# Patient Record
Sex: FEMALE | Race: WHITE | NOT HISPANIC OR LATINO
[De-identification: names, ages, dates, MRNs, and addresses within clinical notes are randomized per-mention and may not be internally consistent; named-entity substitution may affect disease eponyms.]

---

## 2018-08-17 ENCOUNTER — RESULT REVIEW (OUTPATIENT)
Age: 53
End: 2018-08-17

## 2018-10-26 ENCOUNTER — RESULT REVIEW (OUTPATIENT)
Age: 53
End: 2018-10-26

## 2019-04-29 ENCOUNTER — TRANSCRIPTION ENCOUNTER (OUTPATIENT)
Age: 54
End: 2019-04-29

## 2019-07-01 ENCOUNTER — OUTPATIENT (OUTPATIENT)
Dept: OUTPATIENT SERVICES | Facility: HOSPITAL | Age: 54
LOS: 1 days | End: 2019-07-01
Payer: MEDICAID

## 2019-07-01 PROCEDURE — G9001: CPT

## 2019-07-03 DIAGNOSIS — Z71.89 OTHER SPECIFIED COUNSELING: ICD-10-CM

## 2019-09-10 ENCOUNTER — RESULT REVIEW (OUTPATIENT)
Age: 54
End: 2019-09-10

## 2019-11-01 ENCOUNTER — RESULT REVIEW (OUTPATIENT)
Age: 54
End: 2019-11-01

## 2020-12-14 ENCOUNTER — RESULT REVIEW (OUTPATIENT)
Age: 55
End: 2020-12-14

## 2021-07-14 ENCOUNTER — EMERGENCY (EMERGENCY)
Facility: HOSPITAL | Age: 56
LOS: 0 days | Discharge: HOME | End: 2021-07-14
Attending: EMERGENCY MEDICINE | Admitting: EMERGENCY MEDICINE
Payer: MEDICAID

## 2021-07-14 VITALS
DIASTOLIC BLOOD PRESSURE: 98 MMHG | OXYGEN SATURATION: 98 % | HEIGHT: 62 IN | TEMPERATURE: 98 F | RESPIRATION RATE: 18 BRPM | HEART RATE: 81 BPM | WEIGHT: 199.96 LBS | SYSTOLIC BLOOD PRESSURE: 148 MMHG

## 2021-07-14 DIAGNOSIS — H66.92 OTITIS MEDIA, UNSPECIFIED, LEFT EAR: ICD-10-CM

## 2021-07-14 DIAGNOSIS — G51.0 BELL'S PALSY: ICD-10-CM

## 2021-07-14 DIAGNOSIS — K08.89 OTHER SPECIFIED DISORDERS OF TEETH AND SUPPORTING STRUCTURES: ICD-10-CM

## 2021-07-14 PROCEDURE — 99283 EMERGENCY DEPT VISIT LOW MDM: CPT

## 2021-07-14 NOTE — ED PROVIDER NOTE - OBJECTIVE STATEMENT
56 yo female presents c/o L facial paralysis that started at 1am. pt mentions to have recently experienced dental pain and ear pain. pt states she is following with her dentist this week and was seen by UC and started on amoxicillin for her ear infection. denies any other symptoms including fevers, chill, headache, recent illness/travel, cough, abdominal pain, chest pain, or SOB.

## 2021-07-14 NOTE — ED PROVIDER NOTE - CLINICAL SUMMARY MEDICAL DECISION MAKING FREE TEXT BOX
pt with bells palsy, likely from dental/ear pain.  pt otherwise nontoxic.  pt will be started on steroids.  pt to f/u with neuro outpt

## 2021-07-14 NOTE — ED PROVIDER NOTE - PHYSICAL EXAMINATION
Gen: NAD, AOx3  Head: NCAT  HEENT: PERRL, oral mucosa moist, normal conjunctiva, oropharynx clear without exudate or erythema, TM clear, no dental drainage/edema/abscess   Lung: CTAB, no respiratory distress, no wheezing, rales, rhonchi  CV: normal s1/s2, rrr, Normal perfusion, pulses 2+ throughout  Abd: soft, NTND, no CVA tenderness  Genitourinary: no pelvic tenderness  MSK: No edema, no visible deformities, full range of motion in all 4 extremities  Neuro: L facial droop w/ forehead involvement, CN II-XII grossly intact, no nystagmus/pronator drift, coordination/sensation intact, strength 5/5 BUE/BLE, steady gait   Skin: No rash   Psych: normal affect

## 2021-07-14 NOTE — ED PROVIDER NOTE - NS ED ROS FT
Constitutional: (-) fever  Eyes/ENT: (-) visual changes   Cardiovascular: (-) chest pain, (-) syncope  Respiratory: (-) cough, (-) shortness of breath  Gastrointestinal: (-) vomiting, (-) diarrhea  Genitourinary: (-) dysuria, (-) hesitancy, (-) frequency   Musculoskeletal: (-) neck pain, (-) back pain, (-) joint pain  Integumentary: (-) rash, (-) edema  Neurological: (-) headache, (-) altered mental status, facial paralysis   Allergic/Immunologic: (-) pruritus

## 2021-07-14 NOTE — ED PROVIDER NOTE - PATIENT PORTAL LINK FT
You can access the FollowMyHealth Patient Portal offered by [facility] by registering at the following website: [email or web address]. By joining [employer]’s FollowMyHealth portal, you will also be able to view your health information using other applications (apps) compatible with our system.

## 2021-07-14 NOTE — ED ADULT NURSE NOTE - ISOLATION TYPE:
None Non-Graft Cartilage Fenestration Text: The cartilage was fenestrated with a 2mm punch biopsy to help facilitate healing.

## 2021-07-14 NOTE — ED PROVIDER NOTE - NSFOLLOWUPINSTRUCTIONS_ED_ALL_ED_FT
Please follow up with your primary care physician within 24-72 hours and return immediately if symptoms worsen.    [place] Palsy    WHAT YOU NEED TO KNOW:    Bell palsy is a sudden weakness or paralysis of one side of your face. Bell palsy occurs when the nerve that controls the muscles in your face becomes swollen or irritated.     DISCHARGE INSTRUCTIONS:    Medicines:     Medicine may be given to decrease swelling and irritation of your facial nerve. You may receive antiviral medicine if your healthcare provider thinks a virus caused your Bell palsy. Your healthcare provider may also suggest acetaminophen or ibuprofen to reduce pain. These medicines are available without a doctor's order. Ask which medicine to take, and how much you need. Follow directions. Acetaminophen can cause liver damage, and ibuprofen can cause stomach bleeding or kidney damage.       Take your medicine as directed. Contact your healthcare provider if you think your medicine is not helping or if you have side effects. Tell him if you are allergic to any medicine. Keep a list of the medicines, vitamins, and herbs you take. Include the amounts, and when and why you take them. Bring the list or the pill bottles to follow-up visits. Carry your medicine list with you in case of an emergency.    Follow up with your healthcare provider as directed: Write down your questions so you remember to ask them during your visits.    Eye care: Your healthcare provider may recommend that you use artificial tears during the day to keep your eye moist. You may need to use an eye ointment at night. You may also need to wear a patch over your eye and tape it shut while you sleep. This helps keep your eye from getting dry and infected. Wear sunglasses to protect your eye from direct sunlight. Stay away from places that have fumes, dust, or other particles in the air that may harm your eye.    Physical therapy: A physical therapist may teach you how to massage your face and exercise the nerves and muscles in your face. This may help you get better sooner and prevent long-term problems. You can exercise on your own when your facial movement begins to return. Open and close your eye, wink, and smile wide. Do the exercises for 15 or 20 minutes several times a day.    Contact your healthcare provider if:     You have a fever.      Your eye becomes red, irritated, or painful.      You have questions or concerns about your condition or care.    Return to the emergency department if:     You develop weakness or numbness on one side of your body (other than your face).      You have double vision, or you lose vision in your eye.      You have trouble thinking clearly.

## 2021-07-14 NOTE — ED PROVIDER NOTE - ATTENDING CONTRIBUTION TO CARE
56 yo f with no pmh, presents with L sided facial droop started 1 am.  pt says has been dealing with L upper dental pain and ear pain.  had been seen at an  and given amox for ear infection.  no headache, no n/v/d, no fever, chills, no diff swallowing.  no recent travel.  exam: nad, ncat, perrl, eomi, mmm, rrr, ctab, abd soft, nt,nd, aox3, cn7 palsy, forehead nonsparing, 5/5 strength all ext, sensation intact, finger to nose normal, romberg neg, prontator drift neg, gait normal imp: pt with bells palsy, likely from dental/ear pain.  pt otherwise nontoxic.  pt will be started on steroids.  pt to f/u with neuro outpt

## 2021-07-14 NOTE — ED ADULT TRIAGE NOTE - CHIEF COMPLAINT QUOTE
Pt c/o L facial paralysis that began at 0100 yesterday after dental pain began, pt cleared by jian GARRISON. No other neuro deficits.

## 2021-07-22 ENCOUNTER — EMERGENCY (EMERGENCY)
Facility: HOSPITAL | Age: 56
LOS: 0 days | Discharge: HOME | End: 2021-07-22
Attending: EMERGENCY MEDICINE | Admitting: EMERGENCY MEDICINE
Payer: MEDICAID

## 2021-07-22 VITALS
TEMPERATURE: 97 F | DIASTOLIC BLOOD PRESSURE: 73 MMHG | HEART RATE: 73 BPM | RESPIRATION RATE: 18 BRPM | SYSTOLIC BLOOD PRESSURE: 147 MMHG | OXYGEN SATURATION: 99 %

## 2021-07-22 VITALS
SYSTOLIC BLOOD PRESSURE: 178 MMHG | OXYGEN SATURATION: 97 % | DIASTOLIC BLOOD PRESSURE: 72 MMHG | RESPIRATION RATE: 20 BRPM | TEMPERATURE: 98 F | HEIGHT: 62 IN | WEIGHT: 197.09 LBS | HEART RATE: 85 BPM

## 2021-07-22 DIAGNOSIS — Z88.6 ALLERGY STATUS TO ANALGESIC AGENT: ICD-10-CM

## 2021-07-22 DIAGNOSIS — I10 ESSENTIAL (PRIMARY) HYPERTENSION: ICD-10-CM

## 2021-07-22 DIAGNOSIS — Z79.899 OTHER LONG TERM (CURRENT) DRUG THERAPY: ICD-10-CM

## 2021-07-22 DIAGNOSIS — G51.0 BELL'S PALSY: ICD-10-CM

## 2021-07-22 DIAGNOSIS — H92.02 OTALGIA, LEFT EAR: ICD-10-CM

## 2021-07-22 DIAGNOSIS — E78.5 HYPERLIPIDEMIA, UNSPECIFIED: ICD-10-CM

## 2021-07-22 DIAGNOSIS — R11.0 NAUSEA: ICD-10-CM

## 2021-07-22 DIAGNOSIS — Z88.1 ALLERGY STATUS TO OTHER ANTIBIOTIC AGENTS STATUS: ICD-10-CM

## 2021-07-22 DIAGNOSIS — Z88.2 ALLERGY STATUS TO SULFONAMIDES: ICD-10-CM

## 2021-07-22 DIAGNOSIS — E03.9 HYPOTHYROIDISM, UNSPECIFIED: ICD-10-CM

## 2021-07-22 LAB
ANION GAP SERPL CALC-SCNC: 8 MMOL/L — SIGNIFICANT CHANGE UP (ref 7–14)
BASOPHILS # BLD AUTO: 0.04 K/UL — SIGNIFICANT CHANGE UP (ref 0–0.2)
BASOPHILS NFR BLD AUTO: 0.4 % — SIGNIFICANT CHANGE UP (ref 0–1)
BUN SERPL-MCNC: 26 MG/DL — HIGH (ref 10–20)
CALCIUM SERPL-MCNC: 9.7 MG/DL — SIGNIFICANT CHANGE UP (ref 8.5–10.1)
CHLORIDE SERPL-SCNC: 99 MMOL/L — SIGNIFICANT CHANGE UP (ref 98–110)
CO2 SERPL-SCNC: 31 MMOL/L — SIGNIFICANT CHANGE UP (ref 17–32)
CREAT SERPL-MCNC: 0.9 MG/DL — SIGNIFICANT CHANGE UP (ref 0.7–1.5)
EOSINOPHIL # BLD AUTO: 0.1 K/UL — SIGNIFICANT CHANGE UP (ref 0–0.7)
EOSINOPHIL NFR BLD AUTO: 0.9 % — SIGNIFICANT CHANGE UP (ref 0–8)
GLUCOSE SERPL-MCNC: 93 MG/DL — SIGNIFICANT CHANGE UP (ref 70–99)
HCT VFR BLD CALC: 41 % — SIGNIFICANT CHANGE UP (ref 37–47)
HGB BLD-MCNC: 13.6 G/DL — SIGNIFICANT CHANGE UP (ref 12–16)
IMM GRANULOCYTES NFR BLD AUTO: 1.1 % — HIGH (ref 0.1–0.3)
LYMPHOCYTES # BLD AUTO: 27.5 % — SIGNIFICANT CHANGE UP (ref 20.5–51.1)
LYMPHOCYTES # BLD AUTO: 3.07 K/UL — SIGNIFICANT CHANGE UP (ref 1.2–3.4)
MCHC RBC-ENTMCNC: 28 PG — SIGNIFICANT CHANGE UP (ref 27–31)
MCHC RBC-ENTMCNC: 33.2 G/DL — SIGNIFICANT CHANGE UP (ref 32–37)
MCV RBC AUTO: 84.5 FL — SIGNIFICANT CHANGE UP (ref 81–99)
MONOCYTES # BLD AUTO: 0.7 K/UL — HIGH (ref 0.1–0.6)
MONOCYTES NFR BLD AUTO: 6.3 % — SIGNIFICANT CHANGE UP (ref 1.7–9.3)
NEUTROPHILS # BLD AUTO: 7.15 K/UL — HIGH (ref 1.4–6.5)
NEUTROPHILS NFR BLD AUTO: 63.8 % — SIGNIFICANT CHANGE UP (ref 42.2–75.2)
NRBC # BLD: 0 /100 WBCS — SIGNIFICANT CHANGE UP (ref 0–0)
PLATELET # BLD AUTO: 349 K/UL — SIGNIFICANT CHANGE UP (ref 130–400)
POTASSIUM SERPL-MCNC: 4.2 MMOL/L — SIGNIFICANT CHANGE UP (ref 3.5–5)
POTASSIUM SERPL-SCNC: 4.2 MMOL/L — SIGNIFICANT CHANGE UP (ref 3.5–5)
RBC # BLD: 4.85 M/UL — SIGNIFICANT CHANGE UP (ref 4.2–5.4)
RBC # FLD: 13.4 % — SIGNIFICANT CHANGE UP (ref 11.5–14.5)
SODIUM SERPL-SCNC: 138 MMOL/L — SIGNIFICANT CHANGE UP (ref 135–146)
WBC # BLD: 11.18 K/UL — HIGH (ref 4.8–10.8)
WBC # FLD AUTO: 11.18 K/UL — HIGH (ref 4.8–10.8)

## 2021-07-22 PROCEDURE — 99285 EMERGENCY DEPT VISIT HI MDM: CPT

## 2021-07-22 PROCEDURE — G1004: CPT

## 2021-07-22 PROCEDURE — 70481 CT ORBIT/EAR/FOSSA W/DYE: CPT | Mod: 26,MG

## 2021-07-22 RX ORDER — NEOMYCIN/POLYMYXIN B/HYDROCORT
3 SUSPENSION, DROPS(FINAL DOSAGE FORM)(ML) OTIC (EAR)
Qty: 10 | Refills: 0
Start: 2021-07-22 | End: 2021-07-28

## 2021-07-22 RX ORDER — SODIUM CHLORIDE 9 MG/ML
1000 INJECTION, SOLUTION INTRAVENOUS ONCE
Refills: 0 | Status: COMPLETED | OUTPATIENT
Start: 2021-07-22 | End: 2021-07-22

## 2021-07-22 RX ORDER — MECLIZINE HCL 12.5 MG
50 TABLET ORAL ONCE
Refills: 0 | Status: COMPLETED | OUTPATIENT
Start: 2021-07-22 | End: 2021-07-22

## 2021-07-22 RX ORDER — KETOROLAC TROMETHAMINE 30 MG/ML
30 SYRINGE (ML) INJECTION ONCE
Refills: 0 | Status: DISCONTINUED | OUTPATIENT
Start: 2021-07-22 | End: 2021-07-22

## 2021-07-22 RX ORDER — ONDANSETRON 8 MG/1
4 TABLET, FILM COATED ORAL ONCE
Refills: 0 | Status: COMPLETED | OUTPATIENT
Start: 2021-07-22 | End: 2021-07-22

## 2021-07-22 RX ADMIN — Medication 50 MILLIGRAM(S): at 12:13

## 2021-07-22 RX ADMIN — SODIUM CHLORIDE 1000 MILLILITER(S): 9 INJECTION, SOLUTION INTRAVENOUS at 12:13

## 2021-07-22 RX ADMIN — ONDANSETRON 4 MILLIGRAM(S): 8 TABLET, FILM COATED ORAL at 12:13

## 2021-07-22 RX ADMIN — Medication 30 MILLIGRAM(S): at 13:52

## 2021-07-22 NOTE — ED PROVIDER NOTE - ATTENDING CONTRIBUTION TO CARE
56 yo female  PMH HTN, hypothyroidism, recently diagnosed with Bell's palsy sent by her PMD for CT scan to r/o mastoiditis.  Patient finished a course of Amoxicillin for ear pain and redness,  symptoms have not changed much and now she is taking antiviral medications.  She c/o persistent pain around her ear, was unable to ENT in a timely fashion and her doctor sent her to ED.  Denies HA, neck pain, fever, chills, change in  hearing, no other additional complaints.  Well-appearing female on NAD, + left sided Bell's palsy,. supple neck, mild erythema of the left pinna, nml TM b/l,  no mastoid ttp, or erythema, supple neck, no cervical JUDITH, nml work of breathing.  CT is negative for  acute mastoiditis, WBC WNL, patient was advised to follow up with ENT and her PMD, strict return precautions given.  She verbalized understanding and is amenable with the plan.

## 2021-07-22 NOTE — ED ADULT NURSE NOTE - NSIMPLEMENTINTERV_GEN_ALL_ED
Implemented All Universal Safety Interventions:  [place] to call system. Call bell, personal items and telephone within reach. Instruct patient to call for assistance. Room bathroom lighting operational. Non-slip footwear when patient is off stretcher. Physically safe environment: no spills, clutter or unnecessary equipment. Stretcher in lowest position, wheels locked, appropriate side rails in place.

## 2021-07-22 NOTE — ED PROVIDER NOTE - OBJECTIVE STATEMENT
Pt is a 56y/o female with a pmhx if HLD, recently dx with bells palsy and started on Valacyclovir/prednisone/amoxicillin presents today for eval of worsened left ear pain. Pt was sent in by her PMD for eval of mastoiditis. Pt denies fever, chills, weakness, numbness, CP, SOB.

## 2021-07-22 NOTE — ED PROVIDER NOTE - PHYSICAL EXAMINATION
VITAL SIGNS: I have reviewed nursing notes and confirm.  CONSTITUTIONAL: Well-developed; well-nourished; in no acute distress.   SKIN: skin exam is warm and dry, no acute rash.    HEAD: Normocephalic; atraumatic.  EYES: conjunctiva and sclera clear.  ENT: [place] left ear appears erythematous/edema no pain with manipulation of pinna, external ear canal visualized, Tympanic membrane intact No nasal discharge; airway clear.  NECK: Supple; non tender.  CARD: S1, S2 normal; no murmurs, gallops, or rubs. Regular rate and rhythm.   RESP: No wheezes, rales or rhonchi.  ABD: Normal bowel sounds; soft; non-distended; non-tender  EXT: Normal ROM.  No clubbing, cyanosis or edema.   NEURO: Alert, oriented, grossly unremarkable

## 2021-07-22 NOTE — ED PROVIDER NOTE - PATIENT PORTAL LINK FT
You can access the FollowMyHealth Patient Portal offered by [facility] by registering at the following website: [email or web address]. By joining [employer]’s FollowMyHealth portal, you will also be able to view your health information using other applications (apps) compatible with our system.

## 2021-07-22 NOTE — ED PROVIDER NOTE - CARE PROVIDER_API CALL
Aparna Schmid)  Otolaryngology  [address] [place] [address], [address] [place] [address]  Phone: [phone]  Fax: [phone]  Follow Up Time:

## 2021-07-22 NOTE — ED PROVIDER NOTE - NS ED ROS FT
Eyes:  No visual changes, eye pain or discharge.  ENMT:  + ear pain No hearing changes, pain, discharge or infections. No neck pain or stiffness.  Cardiac:  No chest pain, SOB or edema. No chest pain with exertion.  Respiratory:  No cough or respiratory distress. No hemoptysis. No history of asthma or RAD.  GI:  + nausea No vomiting, diarrhea or abdominal pain.  MS:  No myalgia, muscle weakness, joint pain or back pain.  Neuro:  No headache or weakness.  No LOC.  Skin:  No skin rash.   Endocrine: No history of thyroid disease or diabetes.  Except as documented in the HPI,  all other systems are negative.

## 2021-07-22 NOTE — ED PROVIDER NOTE - CLINICAL SUMMARY MEDICAL DECISION MAKING FREE TEXT BOX
54 yo female  PMH HTN, hypothyroidism, recently diagnosed with Bell's palsy sent by her PMD for CT scan to r/o mastoiditis.  Patient finished a course of Amoxicillin for ear pain and redness,  symptoms have not changed much and now she is taking antiviral medications.  She c/o persistent pain around her ear, was unable to ENT in a timely fashion and her doctor sent her to ED.  Denies HA, neck pain, fever, chills, change in  hearing, no other additional complaints.  Well-appearing female on NAD, + left sided Bell's palsy,. supple neck, mild erythema of the left pinna, nml TM b/l,  no mastoid ttp, or erythema, supple neck, no cervical JUDITH, nml work of breathing.  CT is negative for  acute mastoiditis, WBC WNL, patient was advised to follow up with ENT and her PMD, strict return precautions given.  She verbalized understanding and is amenable with the plan.

## 2021-07-24 LAB
B BURGDOR C6 AB SER-ACNC: NEGATIVE — SIGNIFICANT CHANGE UP
B BURGDOR IGG+IGM SER-ACNC: 0.3 INDEX — SIGNIFICANT CHANGE UP (ref 0.01–0.89)

## 2021-07-26 ENCOUNTER — APPOINTMENT (OUTPATIENT)
Dept: OTOLARYNGOLOGY | Facility: CLINIC | Age: 56
End: 2021-07-26
Payer: MEDICAID

## 2021-07-26 VITALS — BODY MASS INDEX: 36.25 KG/M2 | WEIGHT: 197 LBS | HEIGHT: 62 IN

## 2021-07-26 DIAGNOSIS — F41.9 ANXIETY DISORDER, UNSPECIFIED: ICD-10-CM

## 2021-07-26 DIAGNOSIS — F32.9 MAJOR DEPRESSIVE DISORDER, SINGLE EPISODE, UNSPECIFIED: ICD-10-CM

## 2021-07-26 DIAGNOSIS — Z78.9 OTHER SPECIFIED HEALTH STATUS: ICD-10-CM

## 2021-07-26 DIAGNOSIS — Z83.3 FAMILY HISTORY OF DIABETES MELLITUS: ICD-10-CM

## 2021-07-26 PROBLEM — Z00.00 ENCOUNTER FOR PREVENTIVE HEALTH EXAMINATION: Status: ACTIVE | Noted: 2021-07-26

## 2021-07-26 PROCEDURE — 92557 COMPREHENSIVE HEARING TEST: CPT

## 2021-07-26 PROCEDURE — 92570 ACOUSTIC IMMITANCE TESTING: CPT

## 2021-07-26 PROCEDURE — 99204 OFFICE O/P NEW MOD 45 MIN: CPT | Mod: 25

## 2021-07-26 RX ORDER — BUPROPION HYDROCHLORIDE 200 MG/1
TABLET, FILM COATED, EXTENDED RELEASE ORAL
Refills: 0 | Status: ACTIVE | COMMUNITY

## 2021-07-26 RX ORDER — VALACYCLOVIR 500 MG/1
TABLET, FILM COATED ORAL
Refills: 0 | Status: ACTIVE | COMMUNITY

## 2021-07-26 RX ORDER — MECLIZINE HYDROCHLORIDE 25 MG/1
TABLET ORAL
Refills: 0 | Status: ACTIVE | COMMUNITY

## 2021-07-26 RX ORDER — DULOXETINE HYDROCHLORIDE 40 MG/1
CAPSULE, DELAYED RELEASE PELLETS ORAL
Refills: 0 | Status: ACTIVE | COMMUNITY

## 2021-07-26 RX ORDER — PREDNISONE 20 MG/1
20 TABLET ORAL
Qty: 63 | Refills: 0 | Status: ACTIVE | COMMUNITY
Start: 2021-07-26 | End: 1900-01-01

## 2021-07-28 NOTE — CONSULT LETTER
[Dear  ___] : Dear  [unfilled], [Consult Letter:] : I had the pleasure of evaluating your patient, [unfilled]. [Please see my note below.] : Please see my note below. [Consult Closing:] : Thank you very much for allowing me to participate in the care of this patient.  If you have any questions, please do not hesitate to contact me. [Sincerely,] : Sincerely, [FreeTextEntry2] : Myranda Morales MD [FreeTextEntry3] : Aparna Schmid MD\par Otolaryngology - Head & Neck Surgery\par

## 2021-07-28 NOTE — REASON FOR VISIT
[Initial Evaluation] : an initial evaluation for [FreeTextEntry2] : left otalgia and facial weakness

## 2021-07-28 NOTE — PHYSICAL EXAM
[Midline] : trachea located in midline position [Normal] : no rashes [de-identified] : several small maculopapular lesions noted on left conchal bowl, with crusting. Erythema of left EAC, minimal EAC edema  [de-identified] : HB VI/VI

## 2021-07-28 NOTE — DATA REVIEWED
[de-identified] : relevant images and reports personally reviewed by me:\par 7/26/21: type A tymps AU, hearing WNL right and mild to mod SNHL left, Avalos lateralizes to right, consistent with left SNHL [de-identified] : relevant images and reports personally reviewed by me:\par \par Ct temporal bones performed 7/22/21: unremarkable CT scan.

## 2021-07-28 NOTE — ASSESSMENT
[FreeTextEntry1] : - restart high dose steroids, with taper, 2 weeks at 60mg, then 1 week at 40mg, then 1 week at 20mg\par - continue antiviral\par - will obtain MRI IAC\par - discussed eye care, eye drops during the day, lacrilube and tape eye closed/patch at night\par - f/up in 2 weeks

## 2021-07-28 NOTE — HISTORY OF PRESENT ILLNESS
[de-identified] : Patient presents today with c/o left otalgia, nausea, bells palsy.. Patient states about 3 weeks ago started having ear pain. Seen in the UC; and sent to the ER due to left sided facial droop. Patient admits having blisters in the left ear. Patient seen in the ER to rule out stroke. Put on Valacyclovir by PCP and prednisone 60mg for 1 week, by ED, completed last week. Patient admits being bit by a tick sometime in June 2021. Patient also admits having water balloon pop in her left ear the end of June. Also needed to have tooth extracted- done Friday 7/23/21. Currently patient continues to have left facial droop. Jaw pain. Left otalgia. Muffled hearing loss. Sharp pains in the ear at times. Eye twitching at times. Unstable gait.

## 2021-08-14 ENCOUNTER — OUTPATIENT (OUTPATIENT)
Dept: OUTPATIENT SERVICES | Facility: HOSPITAL | Age: 56
LOS: 1 days | Discharge: HOME | End: 2021-08-14
Payer: MEDICAID

## 2021-08-14 ENCOUNTER — RESULT REVIEW (OUTPATIENT)
Age: 56
End: 2021-08-14

## 2021-08-14 DIAGNOSIS — G51.0 BELL'S PALSY: ICD-10-CM

## 2021-08-14 PROCEDURE — 70553 MRI BRAIN STEM W/O & W/DYE: CPT | Mod: 26

## 2021-08-19 ENCOUNTER — APPOINTMENT (OUTPATIENT)
Dept: OTOLARYNGOLOGY | Facility: CLINIC | Age: 56
End: 2021-08-19

## 2021-08-22 ENCOUNTER — INPATIENT (INPATIENT)
Facility: HOSPITAL | Age: 56
LOS: 13 days | Discharge: HOME | End: 2021-09-05
Attending: INTERNAL MEDICINE | Admitting: INTERNAL MEDICINE
Payer: MEDICAID

## 2021-08-22 VITALS
TEMPERATURE: 98 F | SYSTOLIC BLOOD PRESSURE: 104 MMHG | OXYGEN SATURATION: 92 % | HEIGHT: 62 IN | DIASTOLIC BLOOD PRESSURE: 75 MMHG | RESPIRATION RATE: 25 BRPM | HEART RATE: 88 BPM

## 2021-08-22 LAB
ALBUMIN SERPL ELPH-MCNC: 3.4 G/DL — LOW (ref 3.5–5.2)
ALP SERPL-CCNC: 58 U/L — SIGNIFICANT CHANGE UP (ref 30–115)
ALT FLD-CCNC: 57 U/L — HIGH (ref 0–41)
ANION GAP SERPL CALC-SCNC: 23 MMOL/L — HIGH (ref 7–14)
ANISOCYTOSIS BLD QL: SLIGHT — SIGNIFICANT CHANGE UP
AST SERPL-CCNC: 72 U/L — HIGH (ref 0–41)
BASE EXCESS BLDV CALC-SCNC: 3.9 MMOL/L — HIGH (ref -2–2)
BASOPHILS # BLD AUTO: 0 K/UL — SIGNIFICANT CHANGE UP (ref 0–0.2)
BASOPHILS NFR BLD AUTO: 0 % — SIGNIFICANT CHANGE UP (ref 0–1)
BILIRUB SERPL-MCNC: 0.4 MG/DL — SIGNIFICANT CHANGE UP (ref 0.2–1.2)
BUN SERPL-MCNC: 19 MG/DL — SIGNIFICANT CHANGE UP (ref 10–20)
CA-I SERPL-SCNC: 1.19 MMOL/L — SIGNIFICANT CHANGE UP (ref 1.12–1.3)
CALCIUM SERPL-MCNC: 9.4 MG/DL — SIGNIFICANT CHANGE UP (ref 8.5–10.1)
CHLORIDE SERPL-SCNC: 89 MMOL/L — LOW (ref 98–110)
CO2 SERPL-SCNC: 18 MMOL/L — SIGNIFICANT CHANGE UP (ref 17–32)
CREAT SERPL-MCNC: 0.9 MG/DL — SIGNIFICANT CHANGE UP (ref 0.7–1.5)
D DIMER BLD IA.RAPID-MCNC: 348 NG/ML DDU — HIGH (ref 0–230)
EOSINOPHIL # BLD AUTO: 0 K/UL — SIGNIFICANT CHANGE UP (ref 0–0.7)
EOSINOPHIL NFR BLD AUTO: 0 % — SIGNIFICANT CHANGE UP (ref 0–8)
GAS PNL BLDV: 131 MMOL/L — LOW (ref 136–145)
GAS PNL BLDV: SIGNIFICANT CHANGE UP
GLUCOSE SERPL-MCNC: 134 MG/DL — HIGH (ref 70–99)
HCO3 BLDV-SCNC: 29 MMOL/L — SIGNIFICANT CHANGE UP (ref 22–29)
HCT VFR BLD CALC: 44.6 % — SIGNIFICANT CHANGE UP (ref 37–47)
HCT VFR BLDA CALC: 46.7 % — HIGH (ref 34–44)
HGB BLD CALC-MCNC: 15.2 G/DL — SIGNIFICANT CHANGE UP (ref 14–18)
HGB BLD-MCNC: 14.7 G/DL — SIGNIFICANT CHANGE UP (ref 12–16)
LACTATE BLDV-MCNC: 2.1 MMOL/L — HIGH (ref 0.5–1.6)
LYMPHOCYTES # BLD AUTO: 0 % — LOW (ref 20.5–51.1)
LYMPHOCYTES # BLD AUTO: 0 K/UL — LOW (ref 1.2–3.4)
MANUAL SMEAR VERIFICATION: SIGNIFICANT CHANGE UP
MCHC RBC-ENTMCNC: 27.4 PG — SIGNIFICANT CHANGE UP (ref 27–31)
MCHC RBC-ENTMCNC: 33 G/DL — SIGNIFICANT CHANGE UP (ref 32–37)
MCV RBC AUTO: 83.2 FL — SIGNIFICANT CHANGE UP (ref 81–99)
METAMYELOCYTES # FLD: 0.9 % — HIGH (ref 0–0)
MONOCYTES # BLD AUTO: 0.54 K/UL — SIGNIFICANT CHANGE UP (ref 0.1–0.6)
MONOCYTES NFR BLD AUTO: 5.3 % — SIGNIFICANT CHANGE UP (ref 1.7–9.3)
NEUTROPHILS # BLD AUTO: 9.56 K/UL — HIGH (ref 1.4–6.5)
NEUTROPHILS NFR BLD AUTO: 93.8 % — HIGH (ref 42.2–75.2)
NT-PROBNP SERPL-SCNC: 764 PG/ML — HIGH (ref 0–300)
PCO2 BLDV: 43 MMHG — SIGNIFICANT CHANGE UP (ref 41–51)
PH BLDV: 7.43 — SIGNIFICANT CHANGE UP (ref 7.26–7.43)
PLAT MORPH BLD: NORMAL — SIGNIFICANT CHANGE UP
PLATELET # BLD AUTO: 432 K/UL — HIGH (ref 130–400)
PO2 BLDV: 38 MMHG — SIGNIFICANT CHANGE UP (ref 20–40)
POIKILOCYTOSIS BLD QL AUTO: SIGNIFICANT CHANGE UP
POLYCHROMASIA BLD QL SMEAR: SIGNIFICANT CHANGE UP
POTASSIUM BLDV-SCNC: 4.6 MMOL/L — SIGNIFICANT CHANGE UP (ref 3.3–5.6)
POTASSIUM SERPL-MCNC: 5.4 MMOL/L — HIGH (ref 3.5–5)
POTASSIUM SERPL-SCNC: 5.4 MMOL/L — HIGH (ref 3.5–5)
PROT SERPL-MCNC: 6.5 G/DL — SIGNIFICANT CHANGE UP (ref 6–8)
RBC # BLD: 5.36 M/UL — SIGNIFICANT CHANGE UP (ref 4.2–5.4)
RBC # FLD: 14.3 % — SIGNIFICANT CHANGE UP (ref 11.5–14.5)
RBC BLD AUTO: ABNORMAL
SAO2 % BLDV: 71 % — SIGNIFICANT CHANGE UP
SARS-COV-2 RNA SPEC QL NAA+PROBE: DETECTED
SODIUM SERPL-SCNC: 130 MMOL/L — LOW (ref 135–146)
TROPONIN T SERPL-MCNC: <0.01 NG/ML — SIGNIFICANT CHANGE UP
WBC # BLD: 10.19 K/UL — SIGNIFICANT CHANGE UP (ref 4.8–10.8)
WBC # FLD AUTO: 10.19 K/UL — SIGNIFICANT CHANGE UP (ref 4.8–10.8)

## 2021-08-22 PROCEDURE — 99285 EMERGENCY DEPT VISIT HI MDM: CPT

## 2021-08-22 PROCEDURE — 93010 ELECTROCARDIOGRAM REPORT: CPT

## 2021-08-22 PROCEDURE — 71045 X-RAY EXAM CHEST 1 VIEW: CPT | Mod: 26

## 2021-08-22 RX ORDER — ENOXAPARIN SODIUM 100 MG/ML
40 INJECTION SUBCUTANEOUS
Refills: 0 | Status: DISCONTINUED | OUTPATIENT
Start: 2021-08-22 | End: 2021-09-05

## 2021-08-22 RX ORDER — ENOXAPARIN SODIUM 100 MG/ML
40 INJECTION SUBCUTANEOUS DAILY
Refills: 0 | Status: DISCONTINUED | OUTPATIENT
Start: 2021-08-22 | End: 2021-08-22

## 2021-08-22 RX ORDER — DEXAMETHASONE 0.5 MG/5ML
6 ELIXIR ORAL DAILY
Refills: 0 | Status: DISCONTINUED | OUTPATIENT
Start: 2021-08-22 | End: 2021-08-23

## 2021-08-22 RX ORDER — CHLORHEXIDINE GLUCONATE 213 G/1000ML
1 SOLUTION TOPICAL
Refills: 0 | Status: DISCONTINUED | OUTPATIENT
Start: 2021-08-22 | End: 2021-09-05

## 2021-08-22 RX ORDER — PANTOPRAZOLE SODIUM 20 MG/1
40 TABLET, DELAYED RELEASE ORAL
Refills: 0 | Status: DISCONTINUED | OUTPATIENT
Start: 2021-08-22 | End: 2021-09-05

## 2021-08-22 NOTE — H&P ADULT - NSHPLABSRESULTS_GEN_ALL_CORE
14.7   10.19 )-----------( 432      ( 22 Aug 2021 12:43 )             44.6       08-22    130<L>  |  89<L>  |  19  ----------------------------<  134<H>  5.4<H>   |  18  |  0.9    Ca    9.4      22 Aug 2021 12:43    TPro  6.5  /  Alb  3.4<L>  /  TBili  0.4  /  DBili  x   /  AST  72<H>  /  ALT  57<H>  /  AlkPhos  58  08-22                      CARDIAC MARKERS ( 22 Aug 2021 12:43 )  x     / <0.01 ng/mL / x     / x     / x            CAPILLARY BLOOD GLUCOSE

## 2021-08-22 NOTE — ED PROVIDER NOTE - OBJECTIVE STATEMENT
56 yo female w/ PMH of COVID+ on 8/14 presents for SOB x 1 week.  Was having chills so last week went to get COVID tested which came back positive.  After that started having increasing SOB and cough.  Associated fevers with Tm101 which resolved last week.  Denies chest pain, hx of blood clots, leg pain, leg swelling, hemoptysis.

## 2021-08-22 NOTE — H&P ADULT - HISTORY OF PRESENT ILLNESS
54 yo female w/ PMHx significant only for recent dx of [place] Palsy.   Diagnosed w/ COVID(+) on 8/14 after having SOB. Patient is unvaccinated.   Presented to ED for worsening SOB & Cough.    Denies chest pain, abdominal pain, N/V.     ED: Patient was tachypnic & tachycardic desating to 85% on RA>> HFNC 50/60 sating 92%  Labs: DDimer 348, , AG 23, Lactate 2.1;   CXR: Diffuse b/l opacities;  EKG: Sinus Tachy    Admit to MICU for monitoring

## 2021-08-22 NOTE — H&P ADULT - NSHPPHYSICALEXAM_GEN_ALL_CORE
GENERAL: Ill appearing; Short of breath; AOx3  HEAD:  Atraumatic, Normocephalic  EYES: Sclera clear  ENT: dry mucous membranes  NECK: Supple, No JVD  CHEST/LUNG: harsh breath sounds b/l; labored respirations  HEART: Regular rate and rhythm;   ABDOMEN: Soft, Nontender, Nondistended  EXTREMITIES:  No edema

## 2021-08-22 NOTE — ED PROVIDER NOTE - CLINICAL SUMMARY MEDICAL DECISION MAKING FREE TEXT BOX
.  .  54 y/o F PMH COVID19, p/w progressive SOB and cough x 9d. Had positive covid test 8d ago. + intermittent fever . + fatigue. + chills. No chandrakant cp, leg swelling, hx VTE.    Pt is NAD; dry MM; + reg tachycardia; RR 26- 28; + rhonchi b/l, desat to 85% on RA; no pedal edema; neuro grossly intact.    Pt placed in HFNC 50L Myo852% --> o2 sat 92-93%; Pt felt better.  Labs reveiwed. ddimer 300;s, trop neg ekg NL sinus no stemi.  CXR + diffuse infiltrate c/w COVID19 PNA.    IMP: hypoxic resp failure 2/2 COVID PNA

## 2021-08-22 NOTE — ED PROVIDER NOTE - NS ED ROS FT
Constitutional: Reports fevers and chills that resolved.   HEENT: No headache, visual changes, eye pain, hearing changes, ear pain, running nose, or sore throat.  Cardiac:  Reports SOB. No chest pain, leg edema, or leg pain.  Respiratory: Reports cough.   GI:  No nausea, vomiting, diarrhea, or abdominal pain.  :  No dysuria, frequency, or urgency.  MS:  No myalgia, muscle weakness, joint pain or back pain.  Neuro:  No dizziness, LOC, paralysis, or N/T.  Skin:  No skin rash.   Endocrine: No polyuria, polyphagia, or polydipsia.

## 2021-08-22 NOTE — H&P ADULT - ASSESSMENT
IMPRESSION:  Acute Hypoxic Respiratory Failure  Severe COVID pneumonia   HO [place] Palsy    PLAN:    CNS:  Avoid CNS depressants.      HEENT: Oral care    PULMONARY:  HOB @45 degrees.  HFNC.  Wean O2 as tolerated.  Dexa 6mg IV Daily; FU ID    CARDIOVASCULAR:  FU Echo, BNP, LE Duplex    GI: GI prophylaxis. Diet as tolerated    RENAL:  Follow up lytes.  Correct as needed.      INFECTIOUS DISEASE: Follow up cultures. Monitor off Abx for now;    HEMATOLOGICAL:  DVT prophylaxis. FU LE duplex.      ENDOCRINE:  Follow up FS.  Insulin protocol if needed.    MUSCULOSKELETAL:  Ambulate as tolerated    MICU for now    IMPRESSION:  Acute Hypoxic Respiratory Failure  Severe COVID pneumonia   HAGMA 2ry to Lactic acidosis  Hyponatremia  HO [place] Palsy    PLAN:    CNS:  Avoid CNS depressants.      HEENT: Oral care    PULMONARY:  HOB @45 degrees.  HFNC.  Wean O2 as tolerated.  Dexa 6mg IV Daily; FU ID recommendations     CARDIOVASCULAR:  FU Echo, BNP, LE Duplex    GI: GI prophylaxis. Diet as tolerated    RENAL:  Follow up lytes.  Correct as needed.      INFECTIOUS DISEASE: Follow up cultures. Monitor off Abx for now;    HEMATOLOGICAL:  DVT prophylaxis. FU LE duplex.      ENDOCRINE:  Follow up FS.  Insulin protocol if needed.    MUSCULOSKELETAL:  Ambulate as tolerated    MICU for now

## 2021-08-22 NOTE — ED PROVIDER NOTE - PHYSICAL EXAMINATION
GENERAL: In mild distress due to SOB   SKIN: warm, dry  HEAD: Normocephalic; atraumatic.  EYES: PERRLA, EOMI  CARD: S1, S2 normal; no murmurs, gallops, or rubs. Regular rate and rhythm.   RESP: Tachypneic with increased work of breathing, coarse breath sounds   ABD: soft, nontender, and nondistended  EXT: No LE TTP or edema bilaterally.  NEURO: Alert, oriented, grossly unremarkable  PSYCH: Cooperative, appropriate.

## 2021-08-22 NOTE — H&P ADULT - ATTENDING COMMENTS
events noted, acute hypoxemic resp failure, severe covid pneumonia, on HHFNC 60/100 recent bells palsy    - decadron 6 IV q 12  - toci x1  - Lovenox q 12  - LE doppler  - ID  - trend marker/ fungitell

## 2021-08-23 LAB
ALBUMIN SERPL ELPH-MCNC: 3.2 G/DL — LOW (ref 3.5–5.2)
ALP SERPL-CCNC: 54 U/L — SIGNIFICANT CHANGE UP (ref 30–115)
ALT FLD-CCNC: 43 U/L — HIGH (ref 0–41)
ANION GAP SERPL CALC-SCNC: 13 MMOL/L — SIGNIFICANT CHANGE UP (ref 7–14)
ANION GAP SERPL CALC-SCNC: 13 MMOL/L — SIGNIFICANT CHANGE UP (ref 7–14)
AST SERPL-CCNC: 37 U/L — SIGNIFICANT CHANGE UP (ref 0–41)
BASOPHILS # BLD AUTO: 0.02 K/UL — SIGNIFICANT CHANGE UP (ref 0–0.2)
BASOPHILS # BLD AUTO: 0.03 K/UL — SIGNIFICANT CHANGE UP (ref 0–0.2)
BASOPHILS NFR BLD AUTO: 0.2 % — SIGNIFICANT CHANGE UP (ref 0–1)
BASOPHILS NFR BLD AUTO: 0.4 % — SIGNIFICANT CHANGE UP (ref 0–1)
BILIRUB SERPL-MCNC: 0.6 MG/DL — SIGNIFICANT CHANGE UP (ref 0.2–1.2)
BUN SERPL-MCNC: 19 MG/DL — SIGNIFICANT CHANGE UP (ref 10–20)
BUN SERPL-MCNC: 19 MG/DL — SIGNIFICANT CHANGE UP (ref 10–20)
CALCIUM SERPL-MCNC: 9.2 MG/DL — SIGNIFICANT CHANGE UP (ref 8.5–10.1)
CALCIUM SERPL-MCNC: 9.3 MG/DL — SIGNIFICANT CHANGE UP (ref 8.5–10.1)
CHLORIDE SERPL-SCNC: 94 MMOL/L — LOW (ref 98–110)
CHLORIDE SERPL-SCNC: 94 MMOL/L — LOW (ref 98–110)
CO2 SERPL-SCNC: 25 MMOL/L — SIGNIFICANT CHANGE UP (ref 17–32)
CO2 SERPL-SCNC: 27 MMOL/L — SIGNIFICANT CHANGE UP (ref 17–32)
COVID-19 SPIKE DOMAIN AB INTERP: NEGATIVE — SIGNIFICANT CHANGE UP
COVID-19 SPIKE DOMAIN ANTIBODY RESULT: 0.4 U/ML — SIGNIFICANT CHANGE UP
CREAT SERPL-MCNC: 0.6 MG/DL — LOW (ref 0.7–1.5)
CREAT SERPL-MCNC: 0.7 MG/DL — SIGNIFICANT CHANGE UP (ref 0.7–1.5)
CRP SERPL-MCNC: 151 MG/L — HIGH
EOSINOPHIL # BLD AUTO: 0.05 K/UL — SIGNIFICANT CHANGE UP (ref 0–0.7)
EOSINOPHIL # BLD AUTO: 0.12 K/UL — SIGNIFICANT CHANGE UP (ref 0–0.7)
EOSINOPHIL NFR BLD AUTO: 0.5 % — SIGNIFICANT CHANGE UP (ref 0–8)
EOSINOPHIL NFR BLD AUTO: 1.4 % — SIGNIFICANT CHANGE UP (ref 0–8)
FERRITIN SERPL-MCNC: 604 NG/ML — HIGH (ref 15–150)
GLUCOSE SERPL-MCNC: 145 MG/DL — HIGH (ref 70–99)
GLUCOSE SERPL-MCNC: 97 MG/DL — SIGNIFICANT CHANGE UP (ref 70–99)
HCT VFR BLD CALC: 40.1 % — SIGNIFICANT CHANGE UP (ref 37–47)
HCT VFR BLD CALC: 40.9 % — SIGNIFICANT CHANGE UP (ref 37–47)
HGB BLD-MCNC: 13.3 G/DL — SIGNIFICANT CHANGE UP (ref 12–16)
HGB BLD-MCNC: 13.7 G/DL — SIGNIFICANT CHANGE UP (ref 12–16)
IMM GRANULOCYTES NFR BLD AUTO: 3 % — HIGH (ref 0.1–0.3)
IMM GRANULOCYTES NFR BLD AUTO: 4.1 % — HIGH (ref 0.1–0.3)
LYMPHOCYTES # BLD AUTO: 0.45 K/UL — LOW (ref 1.2–3.4)
LYMPHOCYTES # BLD AUTO: 0.84 K/UL — LOW (ref 1.2–3.4)
LYMPHOCYTES # BLD AUTO: 4.5 % — LOW (ref 20.5–51.1)
LYMPHOCYTES # BLD AUTO: 9.8 % — LOW (ref 20.5–51.1)
MAGNESIUM SERPL-MCNC: 2.1 MG/DL — SIGNIFICANT CHANGE UP (ref 1.8–2.4)
MCHC RBC-ENTMCNC: 27.8 PG — SIGNIFICANT CHANGE UP (ref 27–31)
MCHC RBC-ENTMCNC: 28.1 PG — SIGNIFICANT CHANGE UP (ref 27–31)
MCHC RBC-ENTMCNC: 33.2 G/DL — SIGNIFICANT CHANGE UP (ref 32–37)
MCHC RBC-ENTMCNC: 33.5 G/DL — SIGNIFICANT CHANGE UP (ref 32–37)
MCV RBC AUTO: 83.1 FL — SIGNIFICANT CHANGE UP (ref 81–99)
MCV RBC AUTO: 84.8 FL — SIGNIFICANT CHANGE UP (ref 81–99)
MONOCYTES # BLD AUTO: 0.19 K/UL — SIGNIFICANT CHANGE UP (ref 0.1–0.6)
MONOCYTES # BLD AUTO: 0.34 K/UL — SIGNIFICANT CHANGE UP (ref 0.1–0.6)
MONOCYTES NFR BLD AUTO: 1.9 % — SIGNIFICANT CHANGE UP (ref 1.7–9.3)
MONOCYTES NFR BLD AUTO: 4 % — SIGNIFICANT CHANGE UP (ref 1.7–9.3)
MRSA PCR RESULT.: NEGATIVE — SIGNIFICANT CHANGE UP
NEUTROPHILS # BLD AUTO: 6.89 K/UL — HIGH (ref 1.4–6.5)
NEUTROPHILS # BLD AUTO: 8.98 K/UL — HIGH (ref 1.4–6.5)
NEUTROPHILS NFR BLD AUTO: 80.3 % — HIGH (ref 42.2–75.2)
NEUTROPHILS NFR BLD AUTO: 89.9 % — HIGH (ref 42.2–75.2)
NRBC # BLD: 0 /100 WBCS — SIGNIFICANT CHANGE UP (ref 0–0)
NRBC # BLD: 0 /100 WBCS — SIGNIFICANT CHANGE UP (ref 0–0)
PLATELET # BLD AUTO: 417 K/UL — HIGH (ref 130–400)
PLATELET # BLD AUTO: 447 K/UL — HIGH (ref 130–400)
POTASSIUM SERPL-MCNC: 4.1 MMOL/L — SIGNIFICANT CHANGE UP (ref 3.5–5)
POTASSIUM SERPL-MCNC: 4.3 MMOL/L — SIGNIFICANT CHANGE UP (ref 3.5–5)
POTASSIUM SERPL-SCNC: 4.1 MMOL/L — SIGNIFICANT CHANGE UP (ref 3.5–5)
POTASSIUM SERPL-SCNC: 4.3 MMOL/L — SIGNIFICANT CHANGE UP (ref 3.5–5)
PROCALCITONIN SERPL-MCNC: 0.06 NG/ML — SIGNIFICANT CHANGE UP (ref 0.02–0.1)
PROT SERPL-MCNC: 5.7 G/DL — LOW (ref 6–8)
RBC # BLD: 4.73 M/UL — SIGNIFICANT CHANGE UP (ref 4.2–5.4)
RBC # BLD: 4.92 M/UL — SIGNIFICANT CHANGE UP (ref 4.2–5.4)
RBC # FLD: 14.1 % — SIGNIFICANT CHANGE UP (ref 11.5–14.5)
RBC # FLD: 14.3 % — SIGNIFICANT CHANGE UP (ref 11.5–14.5)
SARS-COV-2 IGG+IGM SERPL QL IA: 0.4 U/ML — SIGNIFICANT CHANGE UP
SARS-COV-2 IGG+IGM SERPL QL IA: NEGATIVE — SIGNIFICANT CHANGE UP
SODIUM SERPL-SCNC: 132 MMOL/L — LOW (ref 135–146)
SODIUM SERPL-SCNC: 134 MMOL/L — LOW (ref 135–146)
WBC # BLD: 8.57 K/UL — SIGNIFICANT CHANGE UP (ref 4.8–10.8)
WBC # BLD: 9.99 K/UL — SIGNIFICANT CHANGE UP (ref 4.8–10.8)
WBC # FLD AUTO: 8.57 K/UL — SIGNIFICANT CHANGE UP (ref 4.8–10.8)
WBC # FLD AUTO: 9.99 K/UL — SIGNIFICANT CHANGE UP (ref 4.8–10.8)

## 2021-08-23 PROCEDURE — 93970 EXTREMITY STUDY: CPT | Mod: 26

## 2021-08-23 PROCEDURE — 99222 1ST HOSP IP/OBS MODERATE 55: CPT

## 2021-08-23 RX ORDER — DULOXETINE HYDROCHLORIDE 30 MG/1
2 CAPSULE, DELAYED RELEASE ORAL
Qty: 0 | Refills: 0 | DISCHARGE

## 2021-08-23 RX ORDER — BUPROPION HYDROCHLORIDE 150 MG/1
1 TABLET, EXTENDED RELEASE ORAL
Qty: 0 | Refills: 0 | DISCHARGE

## 2021-08-23 RX ORDER — DEXAMETHASONE 0.5 MG/5ML
6 ELIXIR ORAL
Refills: 0 | Status: DISCONTINUED | OUTPATIENT
Start: 2021-08-23 | End: 2021-08-30

## 2021-08-23 RX ORDER — DEXAMETHASONE 0.5 MG/5ML
6 ELIXIR ORAL
Refills: 0 | Status: DISCONTINUED | OUTPATIENT
Start: 2021-08-23 | End: 2021-08-23

## 2021-08-23 RX ORDER — DULOXETINE HYDROCHLORIDE 30 MG/1
120 CAPSULE, DELAYED RELEASE ORAL DAILY
Refills: 0 | Status: DISCONTINUED | OUTPATIENT
Start: 2021-08-23 | End: 2021-09-05

## 2021-08-23 RX ORDER — BUPROPION HYDROCHLORIDE 150 MG/1
150 TABLET, EXTENDED RELEASE ORAL DAILY
Refills: 0 | Status: DISCONTINUED | OUTPATIENT
Start: 2021-08-23 | End: 2021-09-05

## 2021-08-23 RX ORDER — TOCILIZUMAB 20 MG/ML
500 INJECTION, SOLUTION, CONCENTRATE INTRAVENOUS ONCE
Refills: 0 | Status: COMPLETED | OUTPATIENT
Start: 2021-08-23 | End: 2021-08-23

## 2021-08-23 RX ORDER — ATOVAQUONE 750 MG/5ML
1500 SUSPENSION ORAL DAILY
Refills: 0 | Status: DISCONTINUED | OUTPATIENT
Start: 2021-08-23 | End: 2021-09-05

## 2021-08-23 RX ORDER — TOCILIZUMAB 20 MG/ML
470 INJECTION, SOLUTION, CONCENTRATE INTRAVENOUS ONCE
Refills: 0 | Status: DISCONTINUED | OUTPATIENT
Start: 2021-08-23 | End: 2021-08-23

## 2021-08-23 RX ADMIN — ENOXAPARIN SODIUM 40 MILLIGRAM(S): 100 INJECTION SUBCUTANEOUS at 06:13

## 2021-08-23 RX ADMIN — ATOVAQUONE 1500 MILLIGRAM(S): 750 SUSPENSION ORAL at 15:14

## 2021-08-23 RX ADMIN — PANTOPRAZOLE SODIUM 40 MILLIGRAM(S): 20 TABLET, DELAYED RELEASE ORAL at 06:13

## 2021-08-23 RX ADMIN — Medication 6 MILLIGRAM(S): at 17:38

## 2021-08-23 RX ADMIN — Medication 6 MILLIGRAM(S): at 06:13

## 2021-08-23 RX ADMIN — ENOXAPARIN SODIUM 40 MILLIGRAM(S): 100 INJECTION SUBCUTANEOUS at 17:38

## 2021-08-23 RX ADMIN — TOCILIZUMAB 100 MILLIGRAM(S): 20 INJECTION, SOLUTION, CONCENTRATE INTRAVENOUS at 15:14

## 2021-08-23 RX ADMIN — DULOXETINE HYDROCHLORIDE 120 MILLIGRAM(S): 30 CAPSULE, DELAYED RELEASE ORAL at 15:14

## 2021-08-23 RX ADMIN — BUPROPION HYDROCHLORIDE 150 MILLIGRAM(S): 150 TABLET, EXTENDED RELEASE ORAL at 15:14

## 2021-08-23 RX ADMIN — CHLORHEXIDINE GLUCONATE 1 APPLICATION(S): 213 SOLUTION TOPICAL at 06:14

## 2021-08-23 NOTE — CONSULT NOTE ADULT - SUBJECTIVE AND OBJECTIVE BOX
KEN QUIROS  55y, Female  Allergy: codeine (Unknown)  erythromycin (Unknown)  sulfa drugs (Unknown)      All historical available data reviewed.    HPI:  54 yo female w/ PMHx significant only for recent dx of [place] Palsy.   Diagnosed w/ COVID(+) on 8/14 after having SOB, weakness since 8/11. Patient is unvaccinated.   Presented to ED for worsening SOB & Cough.  Daughter tested positive too.    Denies chest pain, abdominal pain, N/V.     ED: Patient was tachypnic & tachycardic desating to 85% on RA>> HFNC 50/60 sating 92%  Labs: DDimer 348, , AG 23, Lactate 2.1;   CXR: Diffuse b/l opacities;  EKG: Sinus Tachy    Admit to MICU for monitoring    (22 Aug 2021 18:15)    FAMILY HISTORY:    PAST MEDICAL & SURGICAL HISTORY:        VITALS:  T(F): 97.9, Max: 98.7 (08-22-21 @ 17:34)  HR: 90  BP: 142/59  RR: 29Vital Signs Last 24 Hrs  T(C): 36.6 (23 Aug 2021 08:00), Max: 37.1 (22 Aug 2021 17:34)  T(F): 97.9 (23 Aug 2021 08:00), Max: 98.7 (22 Aug 2021 17:34)  HR: 90 (23 Aug 2021 08:44) (77 - 95)  BP: 142/59 (23 Aug 2021 08:00) (104/75 - 145/82)  BP(mean): 88 (23 Aug 2021 08:00) (75 - 107)  RR: 29 (23 Aug 2021 08:44) (20 - 64)  SpO2: 95% (23 Aug 2021 08:44) (85% - 99%)    TESTS & MEASUREMENTS:                        13.7   8.57  )-----------( 417      ( 23 Aug 2021 05:28 )             40.9     08-23    132<L>  |  94<L>  |  19  ----------------------------<  97  4.1   |  25  |  0.7    Ca    9.3      23 Aug 2021 05:28  Mg     2.1     08-23    TPro  5.7<L>  /  Alb  3.2<L>  /  TBili  0.6  /  DBili  x   /  AST  37  /  ALT  43<H>  /  AlkPhos  54  08-23    LIVER FUNCTIONS - ( 23 Aug 2021 05:28 )  Alb: 3.2 g/dL / Pro: 5.7 g/dL / ALK PHOS: 54 U/L / ALT: 43 U/L / AST: 37 U/L / GGT: x                   RADIOLOGY & ADDITIONAL TESTS:  Personal review of radiological diagnostics performed  Echo and EKG results noted when applicable.     MEDICATIONS:  buPROPion XL (24-Hour) . 150 milliGRAM(s) Oral daily  chlorhexidine 4% Liquid 1 Application(s) Topical <User Schedule>  dexAMETHasone     Tablet 6 milliGRAM(s) Oral daily  DULoxetine 120 milliGRAM(s) Oral daily  enoxaparin Injectable 40 milliGRAM(s) SubCutaneous two times a day  pantoprazole    Tablet 40 milliGRAM(s) Oral before breakfast  tocilizumab (EUA) IVPB 470 milliGRAM(s) IV Intermittent once      ANTIBIOTICS:

## 2021-08-23 NOTE — ED ADULT NURSE NOTE - CAS EDN DISCHARGE ASSESSMENT
Alert and oriented to person, place and time Pt COVID-19 positive; precautions in place/Alert and oriented to person, place and time/Patient baseline mental status

## 2021-08-23 NOTE — PROGRESS NOTE ADULT - SUBJECTIVE AND OBJECTIVE BOX
KEN QUIROS 55y Female  MRN#: [id number]   CODE STATUS: Full code    Hospital Day: 1d    Pt is currently admitted with the primary diagnosis of COVID PNA    SUBJECTIVE  Hospital Course  54 yo F who tested + for COVID on 8/14 (unvaccianted) presented to ED 8/22/21 after having worsening shortness of breath and cough. Patient was admitted to MICU, now on high flow NC, CXR shows diffuse bilateral opacities. Patient will get 1 dose of toci and starting decadron 6q12.     Overnight events   None  Subjective complaints   SOB                                          ----------------------------------------------------------  OBJECTIVE  PAST MEDICAL & SURGICAL HISTORY                                            -----------------------------------------------------------  ALLERGIES:  codeine (Unknown)  erythromycin (Unknown)  sulfa drugs (Unknown)                                            ------------------------------------------------------------    HOME MEDICATIONS  Home Medications:  buPROPion 150 mg/12 hours (SR) oral tablet, extended release: 1 tab(s) orally once a day (23 Aug 2021 09:13)  DULoxetine 60 mg oral delayed release capsule: 2 tab(s) orally once a day (23 Aug 2021 09:13)                           MEDICATIONS:  MEDICATIONS  (STANDING):  buPROPion XL (24-Hour) . 150 milliGRAM(s) Oral daily  chlorhexidine 4% Liquid 1 Application(s) Topical <User Schedule>  dexAMETHasone  Injectable 6 milliGRAM(s) IV Push two times a day  DULoxetine 120 milliGRAM(s) Oral daily  enoxaparin Injectable 40 milliGRAM(s) SubCutaneous two times a day  pantoprazole    Tablet 40 milliGRAM(s) Oral before breakfast    MEDICATIONS  (PRN):                                              ------------------------------------------------------------  VITAL SIGNS: Last 24 Hours  T(C): 36.6 (23 Aug 2021 08:00), Max: 37.1 (22 Aug 2021 17:34)  T(F): 97.9 (23 Aug 2021 08:00), Max: 98.7 (22 Aug 2021 17:34)  HR: 90 (23 Aug 2021 08:44) (77 - 95)  BP: 142/59 (23 Aug 2021 08:00) (104/75 - 145/82)  BP(mean): 88 (23 Aug 2021 08:00) (75 - 107)  RR: 29 (23 Aug 2021 08:44) (20 - 64)  SpO2: 95% (23 Aug 2021 08:44) (85% - 99%)      08-22-21 @ 07:01  -  08-23-21 @ 07:00  --------------------------------------------------------  IN: 0 mL / OUT: 0 mL / NET: 0 mL                                             --------------------------------------------------------------  LABS:                        13.7   8.57  )-----------( 417      ( 23 Aug 2021 05:28 )             40.9     08-23    132<L>  |  94<L>  |  19  ----------------------------<  97  4.1   |  25  |  0.7    Ca    9.3      23 Aug 2021 05:28  Mg     2.1     08-23    TPro  5.7<L>  /  Alb  3.2<L>  /  TBili  0.6  /  DBili  x   /  AST  37  /  ALT  43<H>  /  AlkPhos  54  08-23          Troponin T, Serum: <0.01 ng/mL (08-22-21 @ 12:43)          CARDIAC MARKERS ( 22 Aug 2021 12:43 )  x     / <0.01 ng/mL / x     / x     / x                                                  -------------------------------------------------------------  RADIOLOGY:  < from: Xray Chest 1 View- PORTABLE-Urgent (08.22.21 @ 15:25) >  INTERPRETATION:  Clinical History / Reason for exam: Shortness of breath and fever  Impression:  Diffuse bilateral opacities.                                            --------------------------------------------------------------    PHYSICAL EXAM:  GENERAL: NAD, lying in bed comfortably  HEAD:  Atraumatic, Normocephalic  EYES: EOMI, PERRLA, conjunctiva and sclera clear  NECK: Supple, No JVD  CHEST/LUNG: Decreased breath sounds bilaterally, mildly labored respirations  HEART: Regular rate and rhythm; No murmurs, rubs, or gallops  ABDOMEN:  Soft, Nontender, Nondistended  EXTREMITIES:  2+ Peripheral Pulses, brisk capillary refill. No clubbing, cyanosis, or edema  NERVOUS SYSTEM:  Alert & Oriented X3, speech clear. No deficits   MSK: FROM all 4 extremities, full and equal strength  SKIN: No rashes or lesions                                         --------------------------------------------------------------    ASSESSMENT & PLAN    Past medical history and hospital course     54 yo F with PMHx of Bell's Palsy who tested + for COVID on 8/14 (unvaccianted) presented to ED 8/22/21 after having worsening shortness of breath and cough. Patient was admitted to MICU, now on high flow NC, CXR shows diffuse bilateral opacities. Patient will get 1 dose of toci and starting decadron 6q12.     # Acute hypoxic respiratory failure  # Severe COVID PNA  - Give 1 dose tocilizumab today  - Start Decadron 6gIV q12  - Wean O2 as tolerated, avoid sedation  - ID evaluation appreciated   - F/U TTE  - F/U LE doppler  - Inflammatory markers ferritin, procalcitonin, CRP, d-dimer, fungitell, BNP q48  - Repeat Chest XR every AM  - Educate patient to prone as much as possible    # Metabolic acidosis 2/2 Lactic acidosis  - Trend lactate  - Correct electrolytes as needed     # H/O Bell's Palsy  - Stable                                                                              ----------------------------------------------------  # DVT prophylaxis   # GI prophylaxis   # Diet   # Code status   # Disposition                                                                            --------------------------------------------------------    # Handoff   F/U TTE, doppler

## 2021-08-23 NOTE — CONSULT NOTE ADULT - ASSESSMENT
54 yo female w/ PMHx significant only for recent dx of [place] Palsy.   Diagnosed w/ COVID(+) on 8/14 after having SOB, weakness since 8/11. Patient is unvaccinated.   Presented to ED for worsening SOB & Cough.    IMPRESSION;  COVID 19 with severe illness. SpO2 < 94% on RA and need for supplemental O2.  Pt is in the late inflammatory response phase ot the illness based on the onset of symptoms.  Clinically although on HFNC appears comfortable  CXR scattered opacities  Ddimers 348    RECOMMENDATIONS;  Target SpO2 92 % to 96 %  f/u ferritin, CRP, procalcitonin  Dexamethasone 6 mg iv q24h for 10 days.  Monitor for side effects: hyperglycemia, neurological ( agitation/confusion), adrenal suppression, bacterial and fungal infections  Anticoagulation as per team.

## 2021-08-24 LAB
ALBUMIN SERPL ELPH-MCNC: 3.4 G/DL — LOW (ref 3.5–5.2)
ALP SERPL-CCNC: 53 U/L — SIGNIFICANT CHANGE UP (ref 30–115)
ALT FLD-CCNC: 39 U/L — SIGNIFICANT CHANGE UP (ref 0–41)
ANION GAP SERPL CALC-SCNC: 11 MMOL/L — SIGNIFICANT CHANGE UP (ref 7–14)
AST SERPL-CCNC: 26 U/L — SIGNIFICANT CHANGE UP (ref 0–41)
BILIRUB SERPL-MCNC: 0.4 MG/DL — SIGNIFICANT CHANGE UP (ref 0.2–1.2)
BUN SERPL-MCNC: 21 MG/DL — HIGH (ref 10–20)
CALCIUM SERPL-MCNC: 9.2 MG/DL — SIGNIFICANT CHANGE UP (ref 8.5–10.1)
CHLORIDE SERPL-SCNC: 96 MMOL/L — LOW (ref 98–110)
CO2 SERPL-SCNC: 29 MMOL/L — SIGNIFICANT CHANGE UP (ref 17–32)
CREAT SERPL-MCNC: 0.6 MG/DL — LOW (ref 0.7–1.5)
CRP SERPL-MCNC: 132 MG/L — HIGH
D DIMER BLD IA.RAPID-MCNC: 290 NG/ML DDU — HIGH (ref 0–230)
GLUCOSE SERPL-MCNC: 99 MG/DL — SIGNIFICANT CHANGE UP (ref 70–99)
NT-PROBNP SERPL-SCNC: 198 PG/ML — SIGNIFICANT CHANGE UP (ref 0–300)
POTASSIUM SERPL-MCNC: 4.5 MMOL/L — SIGNIFICANT CHANGE UP (ref 3.5–5)
POTASSIUM SERPL-SCNC: 4.5 MMOL/L — SIGNIFICANT CHANGE UP (ref 3.5–5)
PROCALCITONIN SERPL-MCNC: 0.05 NG/ML — SIGNIFICANT CHANGE UP (ref 0.02–0.1)
PROT SERPL-MCNC: 5.9 G/DL — LOW (ref 6–8)
SODIUM SERPL-SCNC: 136 MMOL/L — SIGNIFICANT CHANGE UP (ref 135–146)

## 2021-08-24 PROCEDURE — 71045 X-RAY EXAM CHEST 1 VIEW: CPT | Mod: 26

## 2021-08-24 PROCEDURE — 93010 ELECTROCARDIOGRAM REPORT: CPT

## 2021-08-24 PROCEDURE — 99232 SBSQ HOSP IP/OBS MODERATE 35: CPT

## 2021-08-24 RX ADMIN — CHLORHEXIDINE GLUCONATE 1 APPLICATION(S): 213 SOLUTION TOPICAL at 07:17

## 2021-08-24 RX ADMIN — Medication 6 MILLIGRAM(S): at 06:12

## 2021-08-24 RX ADMIN — DULOXETINE HYDROCHLORIDE 120 MILLIGRAM(S): 30 CAPSULE, DELAYED RELEASE ORAL at 13:02

## 2021-08-24 RX ADMIN — ENOXAPARIN SODIUM 40 MILLIGRAM(S): 100 INJECTION SUBCUTANEOUS at 06:13

## 2021-08-24 RX ADMIN — Medication 6 MILLIGRAM(S): at 17:45

## 2021-08-24 RX ADMIN — ENOXAPARIN SODIUM 40 MILLIGRAM(S): 100 INJECTION SUBCUTANEOUS at 17:45

## 2021-08-24 RX ADMIN — ATOVAQUONE 1500 MILLIGRAM(S): 750 SUSPENSION ORAL at 12:59

## 2021-08-24 RX ADMIN — PANTOPRAZOLE SODIUM 40 MILLIGRAM(S): 20 TABLET, DELAYED RELEASE ORAL at 06:12

## 2021-08-24 RX ADMIN — BUPROPION HYDROCHLORIDE 150 MILLIGRAM(S): 150 TABLET, EXTENDED RELEASE ORAL at 13:02

## 2021-08-24 NOTE — PROGRESS NOTE ADULT - SUBJECTIVE AND OBJECTIVE BOX
OVERNIGHT EVENTS: events noted, no drips, 60/100, sp toci    Vital Signs Last 24 Hrs  T(C): 36.9 (24 Aug 2021 04:00), Max: 36.9 (24 Aug 2021 04:00)  T(F): 98.4 (24 Aug 2021 04:00), Max: 98.4 (24 Aug 2021 04:00)  HR: 80 (24 Aug 2021 06:00) (80 - 118)  BP: 122/63 (24 Aug 2021 06:00) (114/64 - 152/67)  BP(mean): 89 (24 Aug 2021 06:00) (84 - 116)  RR: 25 (24 Aug 2021 06:00) (25 - 41)  SpO2: 98% (24 Aug 2021 06:00) (83% - 100%)    PHYSICAL EXAMINATION:    GENERAL: The patient is awake and alert in no apparent distress.     HEENT: Head is normocephalic and atraumatic.     NECK: Supple.    LUNGS: bl rhonchi    HEART: Regular rate and rhythm without murmur.    ABDOMEN: Soft, nontender, and nondistended.      EXTREMITIES: Without any cyanosis, clubbing, rash, lesions or edema.    NEUROLOGIC: Grossly intact.    SKIN: No ulceration or induration present.      LABS:                        13.3   9.99  )-----------( 447      ( 23 Aug 2021 11:24 )             40.1     08-24    136  |  96<L>  |  21<H>  ----------------------------<  99  4.5   |  29  |  0.6<L>    Ca    9.2      24 Aug 2021 05:34  Mg     2.1     08-23    TPro  5.9<L>  /  Alb  3.4<L>  /  TBili  0.4  /  DBili  x   /  AST  26  /  ALT  39  /  AlkPhos  53  08-24          CARDIAC MARKERS ( 22 Aug 2021 12:43 )  x     / <0.01 ng/mL / x     / x     / x          D-Dimer Assay, Quantitative: 290 ng/mL DDU (08-24-21 @ 05:34)    Serum Pro-Brain Natriuretic Peptide: 198 pg/mL (08-24-21 @ 05:34)  Serum Pro-Brain Natriuretic Peptide: 764 pg/mL (08-22-21 @ 21:36)      Procalcitonin, Serum: 0.06 ng/mL (08-22-21 @ 12:43)        08-23-21 @ 07:01  -  08-24-21 @ 07:00  --------------------------------------------------------  IN: 100 mL / OUT: 1200 mL / NET: -1100 mL        MICROBIOLOGY:  Culture Results:   No growth to date. (08-22 @ 21:36)      MEDICATIONS  (STANDING):  atovaquone  Suspension 1500 milliGRAM(s) Oral daily  buPROPion XL (24-Hour) . 150 milliGRAM(s) Oral daily  chlorhexidine 4% Liquid 1 Application(s) Topical <User Schedule>  dexAMETHasone  Injectable 6 milliGRAM(s) IV Push two times a day  DULoxetine 120 milliGRAM(s) Oral daily  enoxaparin Injectable 40 milliGRAM(s) SubCutaneous two times a day  pantoprazole    Tablet 40 milliGRAM(s) Oral before breakfast    MEDICATIONS  (PRN):      RADIOLOGY & ADDITIONAL STUDIES:

## 2021-08-24 NOTE — PROGRESS NOTE ADULT - SUBJECTIVE AND OBJECTIVE BOX
KEN QUIROS 55y Female  MRN#: [id number]   CODE STATUS: Full code    Hospital Day: 2d    Pt is currently admitted with the primary diagnosis of COVID PNA    SUBJECTIVE  Hospital Course  56 yo F who tested + for COVID on 8/14 (unvaccianted) presented to ED 8/22/21 after having worsening shortness of breath and cough. Patient was admitted to MICU, now on high flow NC, CXR shows diffuse bilateral opacities. S/p 1 dose of toci, started decadron 6q12 on 8/23/21. Patient has been on steroids since 7/26 for [place] Palsy, therefore we started atovaquone 1500 ml per day for PCP prophylaxis, patient has a sulfa allergy so we cannot use Bactrim.    Overnight events   None  Subjective complaints   SOB                                          ----------------------------------------------------------  OBJECTIVE  PAST MEDICAL & SURGICAL HISTORY                                            -----------------------------------------------------------  ALLERGIES:  codeine (Unknown)  erythromycin (Unknown)  sulfa drugs (Unknown)                                            ------------------------------------------------------------    HOME MEDICATIONS  Home Medications:  buPROPion 150 mg/12 hours (SR) oral tablet, extended release: 1 tab(s) orally once a day (23 Aug 2021 09:13)  DULoxetine 60 mg oral delayed release capsule: 2 tab(s) orally once a day (23 Aug 2021 09:13)                           MEDICATIONS:  MEDICATIONS  (STANDING):  atovaquone  Suspension 1500 milliGRAM(s) Oral daily  buPROPion XL (24-Hour) . 150 milliGRAM(s) Oral daily  chlorhexidine 4% Liquid 1 Application(s) Topical <User Schedule>  dexAMETHasone  Injectable 6 milliGRAM(s) IV Push two times a day  DULoxetine 120 milliGRAM(s) Oral daily  enoxaparin Injectable 40 milliGRAM(s) SubCutaneous two times a day  pantoprazole    Tablet 40 milliGRAM(s) Oral before breakfast    MEDICATIONS  (PRN):                                              ------------------------------------------------------------  VITAL SIGNS: Last 24 Hours  ICU Vital Signs Last 24 Hrs  T(C): 36.9 (24 Aug 2021 04:00), Max: 36.9 (24 Aug 2021 04:00)  T(F): 98.4 (24 Aug 2021 04:00), Max: 98.4 (24 Aug 2021 04:00)  HR: 80 (24 Aug 2021 06:00) (80 - 118)  BP: 122/63 (24 Aug 2021 06:00) (122/63 - 152/67)  BP(mean): 89 (24 Aug 2021 06:00) (84 - 116)  ABP: --                                             --------------------------------------------------------------  LABS:             cret                        13.3   9.99  )-----------( 447      ( 23 Aug 2021 11:24 )             40.1     08-24    136  |  96<L>  |  21<H>  ----------------------------<  99  4.5   |  29  |  0.6<L>    Ca    9.2      24 Aug 2021 05:34  Mg     2.1     08-23    TPro  5.9<L>  /  Alb  3.4<L>  /  TBili  0.4  /  DBili  x   /  AST  26  /  ALT  39  /  AlkPhos  53  08-24      ABP(mean): --  RR: 25 (24 Aug 2021 06:00) (25 - 41)  SpO2: 98% (24 Aug 2021 06:00) (93% - 100%)                                                -------------------------------------------------------------  RADIOLOGY:  < from: [facility] Duplex Lower Ext Vein Scan, Bilat (08.23.21 @ 11:37) >  No evidence of deep venous thrombosis or superficial thrombophlebitis in the bilateral lower extremities.    < end of copied text >  < from: Xray Chest 1 View- PORTABLE-Routine (Xray Chest 1 View- PORTABLE-Routine in AM.) (08.24.21 @ 05:28) >  Unchanged bilateral opacities.    < end of copied text >      < from: Xray Chest 1 View- PORTABLE-Urgent (08.22.21 @ 15:25) >  INTERPRETATION:  Clinical History / Reason for exam: Shortness of breath and fever  Impression:  Diffuse bilateral opacities.                                            --------------------------------------------------------------    PHYSICAL EXAM:  GENERAL: NAD, lying in bed comfortably  HEAD:  Atraumatic, Normocephalic  EYES: EOMI, PERRLA, conjunctiva and sclera clear  NECK: Supple, No JVD  CHEST/LUNG: Decreased breath sounds bilaterally, mildly labored respirations  HEART: Regular rate and rhythm; No murmurs, rubs, or gallops  ABDOMEN:  Soft, Nontender, Nondistended  EXTREMITIES:  2+ Peripheral Pulses, brisk capillary refill. No clubbing, cyanosis, or edema  NERVOUS SYSTEM:  Alert & Oriented X3, speech clear. No deficits   MSK: FROM all 4 extremities, full and equal strength  SKIN: No rashes or lesions                                         --------------------------------------------------------------    ASSESSMENT & PLAN    Past medical history and hospital course     56 yo F with PMHx of Bell's Palsy who tested + for COVID on 8/14 (unvaccianted) presented to ED 8/22/21 after having worsening shortness of breath and cough. Patient was admitted to MICU, now on high flow NC, CXR shows diffuse bilateral opacities. S/p 1 dose of toci and now on decadron 6q12. PCP prophylaxis for chronic steroid use.     # Acute hypoxic respiratory failure  # Severe COVID PNA  - S/p 1 dose tocilizumab 8/23/21  - Continue Decadron 6gIV q12  - Wean O2 as tolerated, avoid sedation  - ID evaluation appreciated   - F/U TTE  - LE doppler: no DVT  - Inflammatory markers ferritin, procalcitonin, CRP, d-dimer, fungitell, BNP q48  - Repeat Chest XR every AM  - Educate patient to prone as much as possible    # Metabolic acidosis 2/2 Lactic acidosis  - Trend lactate  - Correct electrolytes as needed     # H/O Bell's Palsy  - Stable  - Steroid use since 7/26/21, therefore starting PCP prophylaxis, atovaquone 1500 ml, bactrim contraindicated, sulfa allergy                                                                               ----------------------------------------------------  # DVT prophylaxis: Lovenox  # GI prophylaxis: Protonix  # Diet: Regular  # Code status: Full code  # Disposition: MICU                                                                           --------------------------------------------------------    # Handoff   F/U TTE

## 2021-08-24 NOTE — PROGRESS NOTE ADULT - ASSESSMENT
IMPRESSION:  Acute Hypoxic Respiratory Failure on HHFNC 60/100  Severe COVID pneumonia   Hyponatremia improved  HO [place] Palsy    PLAN:    CNS:  Avoid CNS depressants.      HEENT: Oral care    PULMONARY:  HOB @45 degrees.  HFNC.  Wean O2 as tolerated.  Dexa 6mg IV q 12h;  bipap at night    CARDIOVASCULAR:  KEEP EQUAL    GI: GI prophylaxis. Diet as tolerated    RENAL:  Follow up lytes.  Correct as needed.      INFECTIOUS DISEASE: Follow up cultures. sp toci, , trend markers    HEMATOLOGICAL:  DVT prophylaxis. FU LE duplex.      ENDOCRINE:  Follow up FS.  Insulin protocol if needed.    MUSCULOSKELETAL:  Ambulate as tolerated    MICU for now

## 2021-08-25 LAB
ALBUMIN SERPL ELPH-MCNC: 3.6 G/DL — SIGNIFICANT CHANGE UP (ref 3.5–5.2)
ALP SERPL-CCNC: 56 U/L — SIGNIFICANT CHANGE UP (ref 30–115)
ALT FLD-CCNC: 35 U/L — SIGNIFICANT CHANGE UP (ref 0–41)
ANION GAP SERPL CALC-SCNC: 11 MMOL/L — SIGNIFICANT CHANGE UP (ref 7–14)
AST SERPL-CCNC: 22 U/L — SIGNIFICANT CHANGE UP (ref 0–41)
BASOPHILS # BLD AUTO: 0.02 K/UL — SIGNIFICANT CHANGE UP (ref 0–0.2)
BASOPHILS NFR BLD AUTO: 0.2 % — SIGNIFICANT CHANGE UP (ref 0–1)
BILIRUB SERPL-MCNC: 0.3 MG/DL — SIGNIFICANT CHANGE UP (ref 0.2–1.2)
BUN SERPL-MCNC: 26 MG/DL — HIGH (ref 10–20)
CALCIUM SERPL-MCNC: 9.8 MG/DL — SIGNIFICANT CHANGE UP (ref 8.5–10.1)
CHLORIDE SERPL-SCNC: 98 MMOL/L — SIGNIFICANT CHANGE UP (ref 98–110)
CK SERPL-CCNC: 19 U/L — SIGNIFICANT CHANGE UP (ref 0–225)
CO2 SERPL-SCNC: 27 MMOL/L — SIGNIFICANT CHANGE UP (ref 17–32)
CREAT SERPL-MCNC: 0.7 MG/DL — SIGNIFICANT CHANGE UP (ref 0.7–1.5)
EOSINOPHIL # BLD AUTO: 0.02 K/UL — SIGNIFICANT CHANGE UP (ref 0–0.7)
EOSINOPHIL NFR BLD AUTO: 0.2 % — SIGNIFICANT CHANGE UP (ref 0–8)
FERRITIN SERPL-MCNC: 470 NG/ML — HIGH (ref 15–150)
FUNGITELL: <31 PG/ML — SIGNIFICANT CHANGE UP
GLUCOSE SERPL-MCNC: 105 MG/DL — HIGH (ref 70–99)
HCT VFR BLD CALC: 40.8 % — SIGNIFICANT CHANGE UP (ref 37–47)
HGB BLD-MCNC: 13.5 G/DL — SIGNIFICANT CHANGE UP (ref 12–16)
IMM GRANULOCYTES NFR BLD AUTO: 3.4 % — HIGH (ref 0.1–0.3)
LYMPHOCYTES # BLD AUTO: 0.54 K/UL — LOW (ref 1.2–3.4)
LYMPHOCYTES # BLD AUTO: 4.5 % — LOW (ref 20.5–51.1)
MCHC RBC-ENTMCNC: 28.1 PG — SIGNIFICANT CHANGE UP (ref 27–31)
MCHC RBC-ENTMCNC: 33.1 G/DL — SIGNIFICANT CHANGE UP (ref 32–37)
MCV RBC AUTO: 84.8 FL — SIGNIFICANT CHANGE UP (ref 81–99)
MONOCYTES # BLD AUTO: 0.56 K/UL — SIGNIFICANT CHANGE UP (ref 0.1–0.6)
MONOCYTES NFR BLD AUTO: 4.6 % — SIGNIFICANT CHANGE UP (ref 1.7–9.3)
NEUTROPHILS # BLD AUTO: 10.54 K/UL — HIGH (ref 1.4–6.5)
NEUTROPHILS NFR BLD AUTO: 87.1 % — HIGH (ref 42.2–75.2)
NRBC # BLD: 0 /100 WBCS — SIGNIFICANT CHANGE UP (ref 0–0)
PLATELET # BLD AUTO: 531 K/UL — HIGH (ref 130–400)
POTASSIUM SERPL-MCNC: 4.8 MMOL/L — SIGNIFICANT CHANGE UP (ref 3.5–5)
POTASSIUM SERPL-SCNC: 4.8 MMOL/L — SIGNIFICANT CHANGE UP (ref 3.5–5)
PROT SERPL-MCNC: 6.1 G/DL — SIGNIFICANT CHANGE UP (ref 6–8)
RBC # BLD: 4.81 M/UL — SIGNIFICANT CHANGE UP (ref 4.2–5.4)
RBC # FLD: 14.1 % — SIGNIFICANT CHANGE UP (ref 11.5–14.5)
SODIUM SERPL-SCNC: 136 MMOL/L — SIGNIFICANT CHANGE UP (ref 135–146)
TROPONIN T SERPL-MCNC: <0.01 NG/ML — SIGNIFICANT CHANGE UP
TSH SERPL-MCNC: 0.05 UIU/ML — LOW (ref 0.27–4.2)
WBC # BLD: 12.09 K/UL — HIGH (ref 4.8–10.8)
WBC # FLD AUTO: 12.09 K/UL — HIGH (ref 4.8–10.8)

## 2021-08-25 PROCEDURE — 99232 SBSQ HOSP IP/OBS MODERATE 35: CPT

## 2021-08-25 RX ORDER — SENNA PLUS 8.6 MG/1
2 TABLET ORAL AT BEDTIME
Refills: 0 | Status: DISCONTINUED | OUTPATIENT
Start: 2021-08-25 | End: 2021-09-05

## 2021-08-25 RX ORDER — POLYETHYLENE GLYCOL 3350 17 G/17G
17 POWDER, FOR SOLUTION ORAL DAILY
Refills: 0 | Status: DISCONTINUED | OUTPATIENT
Start: 2021-08-25 | End: 2021-09-05

## 2021-08-25 RX ORDER — TRAZODONE HCL 50 MG
50 TABLET ORAL AT BEDTIME
Refills: 0 | Status: DISCONTINUED | OUTPATIENT
Start: 2021-08-25 | End: 2021-09-05

## 2021-08-25 RX ORDER — POLYETHYLENE GLYCOL 3350 17 G/17G
17 POWDER, FOR SOLUTION ORAL ONCE
Refills: 0 | Status: DISCONTINUED | OUTPATIENT
Start: 2021-08-25 | End: 2021-08-25

## 2021-08-25 RX ADMIN — Medication 50 MILLIGRAM(S): at 22:32

## 2021-08-25 RX ADMIN — ENOXAPARIN SODIUM 40 MILLIGRAM(S): 100 INJECTION SUBCUTANEOUS at 18:25

## 2021-08-25 RX ADMIN — ATOVAQUONE 1500 MILLIGRAM(S): 750 SUSPENSION ORAL at 12:04

## 2021-08-25 RX ADMIN — SENNA PLUS 2 TABLET(S): 8.6 TABLET ORAL at 22:32

## 2021-08-25 RX ADMIN — CHLORHEXIDINE GLUCONATE 1 APPLICATION(S): 213 SOLUTION TOPICAL at 05:27

## 2021-08-25 RX ADMIN — Medication 6 MILLIGRAM(S): at 18:26

## 2021-08-25 RX ADMIN — Medication 6 MILLIGRAM(S): at 05:28

## 2021-08-25 RX ADMIN — ENOXAPARIN SODIUM 40 MILLIGRAM(S): 100 INJECTION SUBCUTANEOUS at 05:27

## 2021-08-25 RX ADMIN — BUPROPION HYDROCHLORIDE 150 MILLIGRAM(S): 150 TABLET, EXTENDED RELEASE ORAL at 12:05

## 2021-08-25 RX ADMIN — PANTOPRAZOLE SODIUM 40 MILLIGRAM(S): 20 TABLET, DELAYED RELEASE ORAL at 06:05

## 2021-08-25 RX ADMIN — DULOXETINE HYDROCHLORIDE 120 MILLIGRAM(S): 30 CAPSULE, DELAYED RELEASE ORAL at 12:05

## 2021-08-25 RX ADMIN — POLYETHYLENE GLYCOL 3350 17 GRAM(S): 17 POWDER, FOR SOLUTION ORAL at 12:05

## 2021-08-25 NOTE — PROGRESS NOTE ADULT - SUBJECTIVE AND OBJECTIVE BOX
Over Night Events: events noted, on HHFNC 100%, BIPAP overnight    PHYSICAL EXAM    ICU Vital Signs Last 24 Hrs  T(C): 37.1 (25 Aug 2021 08:00), Max: 37.1 (25 Aug 2021 08:00)  T(F): 98.8 (25 Aug 2021 08:00), Max: 98.8 (25 Aug 2021 08:00)  HR: 84 (25 Aug 2021 08:00) (80 - 140)  BP: 123/54 (25 Aug 2021 08:00) (106/55 - 142/69)  BP(mean): 76 (25 Aug 2021 08:00) (76 - 112)  RR: 29 (25 Aug 2021 08:00) (23 - 40)  SpO2: 96% (25 Aug 2021 08:00) (79% - 100%)      General: ill looking  HEENT: LILY             Lymph Nodes: No cervical LN   Lungs: Bilateral rhonchi  Cardiovascular: Regular   Abdomen: Soft, Positive BS  Extremities: No clubbing   Skin: Warm  Neurological: Non focal       08-24-21 @ 07:01  -  08-25-21 @ 07:00  --------------------------------------------------------  IN:    Oral Fluid: 860 mL  Total IN: 860 mL    OUT:    Voided (mL): 700 mL  Total OUT: 700 mL    Total NET: 160 mL          LABS:                          13.5   12.09 )-----------( 531      ( 25 Aug 2021 05:31 )             40.8                                               08-25    136  |  98  |  26<H>  ----------------------------<  105<H>  4.8   |  27  |  0.7    Ca    9.8      25 Aug 2021 05:31    TPro  6.1  /  Alb  3.6  /  TBili  0.3  /  DBili  x   /  AST  22  /  ALT  35  /  AlkPhos  56  08-25                                                 CARDIAC MARKERS ( 25 Aug 2021 05:31 )  x     / <0.01 ng/mL / 19 U/L / x     / x                                                LIVER FUNCTIONS - ( 25 Aug 2021 05:31 )  Alb: 3.6 g/dL / Pro: 6.1 g/dL / ALK PHOS: 56 U/L / ALT: 35 U/L / AST: 22 U/L / GGT: x                                                  Culture - Blood (collected 22 Aug 2021 21:36)  Source: .Blood None  Preliminary Report (24 Aug 2021 07:01):    No growth to date.                                                                                           MEDICATIONS  (STANDING):  atovaquone  Suspension 1500 milliGRAM(s) Oral daily  buPROPion XL (24-Hour) . 150 milliGRAM(s) Oral daily  chlorhexidine 4% Liquid 1 Application(s) Topical <User Schedule>  dexAMETHasone  Injectable 6 milliGRAM(s) IV Push two times a day  DULoxetine 120 milliGRAM(s) Oral daily  enoxaparin Injectable 40 milliGRAM(s) SubCutaneous two times a day  pantoprazole    Tablet 40 milliGRAM(s) Oral before breakfast    CXR reviewed

## 2021-08-25 NOTE — PROGRESS NOTE ADULT - SUBJECTIVE AND OBJECTIVE BOX
KEN QUIROS 55y Female  MRN#: [id number]   CODE STATUS: Full code    Hospital Day: 3d    Pt is currently admitted with the primary diagnosis of COVID PNA    SUBJECTIVE  Hospital Course  56 yo F who tested + for COVID on 8/14 (unvaccianted) presented to ED 8/22/21 after having worsening shortness of breath and cough. Patient was admitted to MICU, now on high flow NC, CXR shows diffuse bilateral opacities. S/p 1 dose of toci, started decadron 6q12 on 8/23/21. Patient has been on steroids since 7/26 for [place] Palsy, therefore we started atovaquone 1500 ml per day for PCP prophylaxis, patient has a sulfa allergy so we cannot use Bactrim. Patient stable in MICU    Overnight events   None  Subjective complaints   None                                          ----------------------------------------------------------  OBJECTIVE  PAST MEDICAL & SURGICAL HISTORY                                            -----------------------------------------------------------  ALLERGIES:  codeine (Unknown)  erythromycin (Unknown)  sulfa drugs (Unknown)                                            ------------------------------------------------------------    HOME MEDICATIONS  Home Medications:  buPROPion 150 mg/12 hours (SR) oral tablet, extended release: 1 tab(s) orally once a day (23 Aug 2021 09:13)  DULoxetine 60 mg oral delayed release capsule: 2 tab(s) orally once a day (23 Aug 2021 09:13)                           MEDICATIONS:  MEDICATIONS  (STANDING):  atovaquone  Suspension 1500 milliGRAM(s) Oral daily  buPROPion XL (24-Hour) . 150 milliGRAM(s) Oral daily  chlorhexidine 4% Liquid 1 Application(s) Topical <User Schedule>  dexAMETHasone  Injectable 6 milliGRAM(s) IV Push two times a day  DULoxetine 120 milliGRAM(s) Oral daily  enoxaparin Injectable 40 milliGRAM(s) SubCutaneous two times a day  pantoprazole    Tablet 40 milliGRAM(s) Oral before breakfast  polyethylene glycol 3350 17 Gram(s) Oral daily  senna 2 Tablet(s) Oral at bedtime  traZODone 50 milliGRAM(s) Oral at bedtime    MEDICATIONS  (PRN):                                              ------------------------------------------------------------  VITAL SIGNS: Last 24 Hours  ICU Vital Signs Last 24 Hrs  T(C): 37.1 (25 Aug 2021 08:00), Max: 37.1 (25 Aug 2021 08:00)  T(F): 98.8 (25 Aug 2021 08:00), Max: 98.8 (25 Aug 2021 08:00)  HR: 82 (25 Aug 2021 10:00) (80 - 140)  BP: 117/69 (25 Aug 2021 09:00) (106/55 - 142/69)  BP(mean): 89 (25 Aug 2021 09:00) (76 - 112)  ABP: --  ABP(mean): --  RR: 29 (25 Aug 2021 10:00) (23 - 40)  SpO2: 95% (25 Aug 2021 10:00) (79% - 100%)                                             --------------------------------------------------------------  LABS:    08-25 @ 05:31 Creatine 19 U/L [0 - 225]  cret                        13.5   12.09 )-----------( 531      ( 25 Aug 2021 05:31 )             40.8     08-25    136  |  98  |  26<H>  ----------------------------<  105<H>  4.8   |  27  |  0.7    Ca    9.8      25 Aug 2021 05:31    TPro  6.1  /  Alb  3.6  /  TBili  0.3  /  DBili  x   /  AST  22  /  ALT  35  /  AlkPhos  56  08-25                                                  -------------------------------------------------------------  RADIOLOGY:  < from: Xray Chest 1 View- PORTABLE-Routine (Xray Chest 1 View- PORTABLE-Routine in AM.) (08.24.21 @ 05:28) >  Unchanged bilateral opacities.    < end of copied text >      < from: [facility] Duplex Lower Ext Vein Scan, Bilat (08.23.21 @ 11:37) >  No evidence of deep venous thrombosis or superficial thrombophlebitis in the bilateral lower extremities.    < end of copied text >  < from: Xray Chest 1 View- PORTABLE-Routine (Xray Chest 1 View- PORTABLE-Routine in AM.) (08.24.21 @ 05:28) >  Unchanged bilateral opacities.    < end of copied text >      < from: Xray Chest 1 View- PORTABLE-Urgent (08.22.21 @ 15:25) >  INTERPRETATION:  Clinical History / Reason for exam: Shortness of breath and fever  Impression:  Diffuse bilateral opacities.                                            --------------------------------------------------------------    PHYSICAL EXAM:  GENERAL: NAD, lying in bed comfortably  HEAD:  Atraumatic, Normocephalic  EYES: EOMI, PERRLA, conjunctiva and sclera clear  NECK: Supple, No JVD  CHEST/LUNG: Decreased breath sounds bilaterally, mildly labored respirations  HEART: Regular rate and rhythm; No murmurs, rubs, or gallops  ABDOMEN:  Soft, Nontender, Nondistended  EXTREMITIES:  2+ Peripheral Pulses, brisk capillary refill. No clubbing, cyanosis, or edema  NERVOUS SYSTEM:  Alert & Oriented X3, speech clear. No deficits   MSK: FROM all 4 extremities, full and equal strength  SKIN: No rashes or lesions                                         --------------------------------------------------------------    ASSESSMENT & PLAN    Past medical history and hospital course     56 yo F with PMHx of Bell's Palsy who tested + for COVID on 8/14 (unvaccianted) presented to ED 8/22/21 after having worsening shortness of breath and cough. Patient was admitted to MICU, now on high flow NC, CXR shows diffuse bilateral opacities. S/p 1 dose of toci and now on decadron 6q12. PCP prophylaxis for chronic steroid use.     # Acute hypoxic respiratory failure  # Severe COVID PNA  - S/p 1 dose tocilizumab 8/23/21  - Continue Decadron 6gIV q12  - Wean O2 as tolerated, avoid sedation  - ID evaluation appreciated   - F/U TTE  - LE doppler: no DVT  - Inflammatory markers ferritin, procalcitonin, CRP, d-dimer, fungitell, BNP q48  - Repeat Chest XR every AM  - Educate patient to prone as much as possible    # Metabolic acidosis 2/2 Lactic acidosis  - Trend lactate  - Correct electrolytes as needed     # H/O Bell's Palsy  - Stable  - Steroid use since 7/26/21, therefore starting PCP prophylaxis, atovaquone 1500 ml, bactrim contraindicated, sulfa allergy     # Major Depressive Disorder  - Continue home meds                                                                              ----------------------------------------------------  # DVT prophylaxis: Lovenox  # GI prophylaxis: Protonix  # Diet: Regular  # Code status: Full code  [facility]                                                                           --------------------------------------------------------    # Handoff   F/U TTE

## 2021-08-25 NOTE — PROGRESS NOTE ADULT - ASSESSMENT
56 yo female w/ PMHx significant only for recent dx of [place] Palsy.   Diagnosed w/ COVID(+) on 8/14 after having SOB, weakness since 8/11. Patient is unvaccinated.   Presented to ED for worsening SOB & Cough.    IMPRESSION;  COVID 19 with severe illness. SpO2 < 94% on RA and need for supplemental O2.  Pt is in the late inflammatory response phase ot the illness based on the onset of symptoms.  Clinically although on HFNC appears comfortable  CXR scattered opacities  Ddimers 348 > 290  Ferritin 470    procal 0.05  8/23 Duplex LEs : no DVT     RECOMMENDATIONS;  Target SpO2 92 % to 96 %  f/u ferritin, CRP, procalcitonin  Dexamethasone 6 mg iv q24h for 10 days.  Monitor for side effects: hyperglycemia, neurological ( agitation/confusion), adrenal suppression, bacterial and fungal infections  Anticoagulation as per team.

## 2021-08-25 NOTE — PROGRESS NOTE ADULT - SUBJECTIVE AND OBJECTIVE BOX
KEN QUIROS  55y, Female    All available historical data reviewed    OVERNIGHT EVENTS:    no fevers  feels well and has no new complaints  HFNC  ROS:  General: Denies rigors, nightsweats  HEENT: Denies headache, rhinorrhea, sore throat, eye pain  CV: Denies CP, palpitations  PULM: Denies wheezing, hemoptysis  GI: Denies hematemesis, hematochezia, melena  : Denies discharge, hematuria  MSK: Denies arthralgias, myalgias  SKIN: Denies rash, lesions  NEURO: Denies paresthesias, weakness  PSYCH: Denies depression, anxiety    VITALS:  T(F): 98.2, Max: 98.8 (08-25-21 @ 08:00)  HR: 98  BP: 142/61  RR: 42Vital Signs Last 24 Hrs  T(C): 36.8 (25 Aug 2021 12:00), Max: 37.1 (25 Aug 2021 08:00)  T(F): 98.2 (25 Aug 2021 12:00), Max: 98.8 (25 Aug 2021 08:00)  HR: 98 (25 Aug 2021 14:00) (80 - 140)  BP: 142/61 (25 Aug 2021 14:00) (96/63 - 142/69)  BP(mean): 87 (25 Aug 2021 14:00) (76 - 112)  RR: 42 (25 Aug 2021 14:00) (23 - 42)  SpO2: 94% (25 Aug 2021 14:00) (79% - 100%)    TESTS & MEASUREMENTS:                        13.5   12.09 )-----------( 531      ( 25 Aug 2021 05:31 )             40.8     08-25    136  |  98  |  26<H>  ----------------------------<  105<H>  4.8   |  27  |  0.7    Ca    9.8      25 Aug 2021 05:31    TPro  6.1  /  Alb  3.6  /  TBili  0.3  /  DBili  x   /  AST  22  /  ALT  35  /  AlkPhos  56  08-25    LIVER FUNCTIONS - ( 25 Aug 2021 05:31 )  Alb: 3.6 g/dL / Pro: 6.1 g/dL / ALK PHOS: 56 U/L / ALT: 35 U/L / AST: 22 U/L / GGT: x             Culture - Blood (collected 08-22-21 @ 21:36)  Source: .Blood None  Preliminary Report (08-24-21 @ 07:01):    No growth to date.            RADIOLOGY & ADDITIONAL TESTS:  Personal review of radiological diagnostics performed  Echo and EKG results noted when applicable.     MEDICATIONS:  atovaquone  Suspension 1500 milliGRAM(s) Oral daily  buPROPion XL (24-Hour) . 150 milliGRAM(s) Oral daily  chlorhexidine 4% Liquid 1 Application(s) Topical <User Schedule>  dexAMETHasone  Injectable 6 milliGRAM(s) IV Push two times a day  DULoxetine 120 milliGRAM(s) Oral daily  enoxaparin Injectable 40 milliGRAM(s) SubCutaneous two times a day  pantoprazole    Tablet 40 milliGRAM(s) Oral before breakfast  polyethylene glycol 3350 17 Gram(s) Oral daily  senna 2 Tablet(s) Oral at bedtime  traZODone 50 milliGRAM(s) Oral at bedtime      ANTIBIOTICS:  atovaquone  Suspension 1500 milliGRAM(s) Oral daily

## 2021-08-25 NOTE — PROGRESS NOTE ADULT - ASSESSMENT
IMPRESSION:  Acute Hypoxic Respiratory Failure on HHFNC 60/100  Severe COVID pneumonia   Hyponatremia improved  HO [place] Palsy    PLAN:    CNS:  Avoid CNS depressants.      HEENT: Oral care    PULMONARY:  HOB @45 degrees.  HFNC.  Wean O2 as tolerated.  Dexa 6mg IV q 12h;  bipap at night    CARDIOVASCULAR:  KEEP EQUAL    GI: GI prophylaxis. Diet as tolerated    RENAL:  Follow up lytes.  Correct as needed.      INFECTIOUS DISEASE: Follow up cultures. sp toci, , trend markers    HEMATOLOGICAL:  DVT prophylaxis. FU LE duplex neg    ENDOCRINE:  Follow up FS.  Insulin protocol if needed.    MUSCULOSKELETAL:  Ambulate as tolerated    MICU

## 2021-08-26 LAB
ALBUMIN SERPL ELPH-MCNC: 3.3 G/DL — LOW (ref 3.5–5.2)
ALP SERPL-CCNC: 50 U/L — SIGNIFICANT CHANGE UP (ref 30–115)
ALT FLD-CCNC: 28 U/L — SIGNIFICANT CHANGE UP (ref 0–41)
ANION GAP SERPL CALC-SCNC: 10 MMOL/L — SIGNIFICANT CHANGE UP (ref 7–14)
AST SERPL-CCNC: 17 U/L — SIGNIFICANT CHANGE UP (ref 0–41)
BASOPHILS # BLD AUTO: 0.03 K/UL — SIGNIFICANT CHANGE UP (ref 0–0.2)
BASOPHILS NFR BLD AUTO: 0.2 % — SIGNIFICANT CHANGE UP (ref 0–1)
BILIRUB SERPL-MCNC: 0.3 MG/DL — SIGNIFICANT CHANGE UP (ref 0.2–1.2)
BUN SERPL-MCNC: 25 MG/DL — HIGH (ref 10–20)
CALCIUM SERPL-MCNC: 9.7 MG/DL — SIGNIFICANT CHANGE UP (ref 8.5–10.1)
CHLORIDE SERPL-SCNC: 98 MMOL/L — SIGNIFICANT CHANGE UP (ref 98–110)
CO2 SERPL-SCNC: 28 MMOL/L — SIGNIFICANT CHANGE UP (ref 17–32)
CREAT SERPL-MCNC: 0.8 MG/DL — SIGNIFICANT CHANGE UP (ref 0.7–1.5)
EOSINOPHIL # BLD AUTO: 0.03 K/UL — SIGNIFICANT CHANGE UP (ref 0–0.7)
EOSINOPHIL NFR BLD AUTO: 0.2 % — SIGNIFICANT CHANGE UP (ref 0–8)
GLUCOSE SERPL-MCNC: 105 MG/DL — HIGH (ref 70–99)
HCT VFR BLD CALC: 38.6 % — SIGNIFICANT CHANGE UP (ref 37–47)
HGB BLD-MCNC: 12.7 G/DL — SIGNIFICANT CHANGE UP (ref 12–16)
IMM GRANULOCYTES NFR BLD AUTO: 4.9 % — HIGH (ref 0.1–0.3)
LYMPHOCYTES # BLD AUTO: 0.7 K/UL — LOW (ref 1.2–3.4)
LYMPHOCYTES # BLD AUTO: 5.7 % — LOW (ref 20.5–51.1)
MCHC RBC-ENTMCNC: 27.9 PG — SIGNIFICANT CHANGE UP (ref 27–31)
MCHC RBC-ENTMCNC: 32.9 G/DL — SIGNIFICANT CHANGE UP (ref 32–37)
MCV RBC AUTO: 84.6 FL — SIGNIFICANT CHANGE UP (ref 81–99)
MONOCYTES # BLD AUTO: 0.68 K/UL — HIGH (ref 0.1–0.6)
MONOCYTES NFR BLD AUTO: 5.6 % — SIGNIFICANT CHANGE UP (ref 1.7–9.3)
NEUTROPHILS # BLD AUTO: 10.17 K/UL — HIGH (ref 1.4–6.5)
NEUTROPHILS NFR BLD AUTO: 83.4 % — HIGH (ref 42.2–75.2)
NRBC # BLD: 0 /100 WBCS — SIGNIFICANT CHANGE UP (ref 0–0)
PLATELET # BLD AUTO: 486 K/UL — HIGH (ref 130–400)
POTASSIUM SERPL-MCNC: 5.3 MMOL/L — HIGH (ref 3.5–5)
POTASSIUM SERPL-SCNC: 5.3 MMOL/L — HIGH (ref 3.5–5)
PROT SERPL-MCNC: 5.5 G/DL — LOW (ref 6–8)
RBC # BLD: 4.56 M/UL — SIGNIFICANT CHANGE UP (ref 4.2–5.4)
RBC # FLD: 14.1 % — SIGNIFICANT CHANGE UP (ref 11.5–14.5)
SODIUM SERPL-SCNC: 136 MMOL/L — SIGNIFICANT CHANGE UP (ref 135–146)
WBC # BLD: 12.21 K/UL — HIGH (ref 4.8–10.8)
WBC # FLD AUTO: 12.21 K/UL — HIGH (ref 4.8–10.8)

## 2021-08-26 PROCEDURE — 99232 SBSQ HOSP IP/OBS MODERATE 35: CPT

## 2021-08-26 PROCEDURE — 71045 X-RAY EXAM CHEST 1 VIEW: CPT | Mod: 26

## 2021-08-26 RX ORDER — SODIUM ZIRCONIUM CYCLOSILICATE 10 G/10G
10 POWDER, FOR SUSPENSION ORAL ONCE
Refills: 0 | Status: COMPLETED | OUTPATIENT
Start: 2021-08-26 | End: 2021-08-26

## 2021-08-26 RX ORDER — SODIUM ZIRCONIUM CYCLOSILICATE 10 G/10G
5 POWDER, FOR SUSPENSION ORAL
Refills: 0 | Status: COMPLETED | OUTPATIENT
Start: 2021-08-26 | End: 2021-08-27

## 2021-08-26 RX ADMIN — Medication 6 MILLIGRAM(S): at 17:12

## 2021-08-26 RX ADMIN — POLYETHYLENE GLYCOL 3350 17 GRAM(S): 17 POWDER, FOR SOLUTION ORAL at 12:09

## 2021-08-26 RX ADMIN — ENOXAPARIN SODIUM 40 MILLIGRAM(S): 100 INJECTION SUBCUTANEOUS at 06:15

## 2021-08-26 RX ADMIN — BUPROPION HYDROCHLORIDE 150 MILLIGRAM(S): 150 TABLET, EXTENDED RELEASE ORAL at 12:09

## 2021-08-26 RX ADMIN — SODIUM ZIRCONIUM CYCLOSILICATE 10 GRAM(S): 10 POWDER, FOR SUSPENSION ORAL at 09:03

## 2021-08-26 RX ADMIN — SENNA PLUS 2 TABLET(S): 8.6 TABLET ORAL at 21:37

## 2021-08-26 RX ADMIN — ATOVAQUONE 1500 MILLIGRAM(S): 750 SUSPENSION ORAL at 12:09

## 2021-08-26 RX ADMIN — Medication 50 MILLIGRAM(S): at 21:37

## 2021-08-26 RX ADMIN — SODIUM ZIRCONIUM CYCLOSILICATE 5 GRAM(S): 10 POWDER, FOR SUSPENSION ORAL at 17:12

## 2021-08-26 RX ADMIN — ENOXAPARIN SODIUM 40 MILLIGRAM(S): 100 INJECTION SUBCUTANEOUS at 17:12

## 2021-08-26 RX ADMIN — Medication 6 MILLIGRAM(S): at 06:15

## 2021-08-26 RX ADMIN — PANTOPRAZOLE SODIUM 40 MILLIGRAM(S): 20 TABLET, DELAYED RELEASE ORAL at 06:15

## 2021-08-26 RX ADMIN — DULOXETINE HYDROCHLORIDE 120 MILLIGRAM(S): 30 CAPSULE, DELAYED RELEASE ORAL at 12:09

## 2021-08-26 NOTE — PROGRESS NOTE ADULT - ASSESSMENT
IMPRESSION:  Acute Hypoxic Respiratory Failure on HHFNC 60/80  Severe COVID pneumonia   Hyponatremia improved  HO [place] Palsy    PLAN:    CNS:  Avoid CNS depressants.      HEENT: Oral care    PULMONARY:  HOB @45 degrees.  HFNC.  Wean O2 as tolerated.  Dexa 6mg IV q 12h; incentive spirometry    CARDIOVASCULAR:  KEEP EQUAL    GI: GI prophylaxis. Diet as tolerated    RENAL:  Follow up lytes.  Correct as needed.      INFECTIOUS DISEASE: Follow up cultures. sp toci, , trend markers    HEMATOLOGICAL:  DVT prophylaxis.     ENDOCRINE:  Follow up FS.  Insulin protocol if needed.    MUSCULOSKELETAL:  Ambulate as tolerated    MICU

## 2021-08-26 NOTE — PROGRESS NOTE ADULT - SUBJECTIVE AND OBJECTIVE BOX
KEN QUIROS 55y Female  MRN#: [id number]   CODE STATUS: Full code    Hospital Day: 4d    Pt is currently admitted with the primary diagnosis of COVID PNA    SUBJECTIVE  Hospital Course  54 yo F who tested + for COVID on 8/14 (unvaccianted) presented to ED 8/22/21 after having worsening shortness of breath and cough. Patient was admitted to MICU, now on high flow NC, CXR shows diffuse bilateral opacities. S/p 1 dose of toci, started decadron 6q12 on 8/23/21. Patient has been on steroids since 7/26 for [place] Palsy, therefore we started atovaquone 1500 ml per day for PCP prophylaxis, patient has a sulfa allergy so we cannot use Bactrim. Patient stable in MICU, high flow decreased since yesterday, inflammatory markers all trending down.    Overnight events   None  Subjective complaints   None                                          ----------------------------------------------------------  OBJECTIVE  PAST MEDICAL & SURGICAL HISTORY                                            -----------------------------------------------------------  ALLERGIES:  codeine (Unknown)  erythromycin (Unknown)  sulfa drugs (Unknown)                                            ------------------------------------------------------------    HOME MEDICATIONS  Home Medications:  buPROPion 150 mg/12 hours (SR) oral tablet, extended release: 1 tab(s) orally once a day (23 Aug 2021 09:13)  DULoxetine 60 mg oral delayed release capsule: 2 tab(s) orally once a day (23 Aug 2021 09:13)                           MEDICATIONS:  MEDICATIONS  (STANDING):  atovaquone  Suspension 1500 milliGRAM(s) Oral daily  buPROPion XL (24-Hour) . 150 milliGRAM(s) Oral daily  chlorhexidine 4% Liquid 1 Application(s) Topical <User Schedule>  dexAMETHasone  Injectable 6 milliGRAM(s) IV Push two times a day  DULoxetine 120 milliGRAM(s) Oral daily  enoxaparin Injectable 40 milliGRAM(s) SubCutaneous two times a day  pantoprazole    Tablet 40 milliGRAM(s) Oral before breakfast  polyethylene glycol 3350 17 Gram(s) Oral daily  senna 2 Tablet(s) Oral at bedtime  sodium zirconium cyclosilicate 5 Gram(s) Oral two times a day  traZODone 50 milliGRAM(s) Oral at bedtime    MEDICATIONS  (PRN):                                                ------------------------------------------------------------  VITAL SIGNS: Last 24 Hours  ICU Vital Signs Last 24 Hrs  T(C): 36.7 (26 Aug 2021 08:00), Max: 36.8 (25 Aug 2021 12:00)  T(F): 98 (26 Aug 2021 08:00), Max: 98.3 (25 Aug 2021 16:00)  HR: 80 (26 Aug 2021 09:00) (64 - 104)  BP: 109/55 (26 Aug 2021 09:00) (96/63 - 152/83)  BP(mean): 76 (26 Aug 2021 09:00) (63 - 115)  ABP: --  ABP(mean): --  RR: 27 (26 Aug 2021 09:00) (21 - 45)  SpO2: 90% (26 Aug 2021 09:00) (83% - 99%)                                               --------------------------------------------------------------  LABS:    08-25 @ 05:31 Creatine 19 U/L [0 - 225]  cret                        12.7   12.21 )-----------( 486      ( 26 Aug 2021 05:08 )             38.6     08-26    136  |  98  |  25<H>  ----------------------------<  105<H>  5.3<H>   |  28  |  0.8    Ca    9.7      26 Aug 2021 05:08    TPro  5.5<L>  /  Alb  3.3<L>  /  TBili  0.3  /  DBili  x   /  AST  17  /  ALT  28  /  AlkPhos  50  08-26                                                  -------------------------------------------------------------  RADIOLOGY:  < from: Xray Chest 1 View- PORTABLE-Routine (Xray Chest 1 View- PORTABLE-Routine in AM.) (08.26.21 @ 06:25) >  Diffuse bilateral opacities, unchanged.    < end of copied text >  < from: Xray Chest 1 View- PORTABLE-Routine (Xray Chest 1 View- PORTABLE-Routine in AM.) (08.24.21 @ 05:28) >  Unchanged bilateral opacities.    < end of copied text >      < from: [facility] Duplex Lower Ext Vein Scan, Bilat (08.23.21 @ 11:37) >  No evidence of deep venous thrombosis or superficial thrombophlebitis in the bilateral lower extremities.    < end of copied text >  < from: Xray Chest 1 View- PORTABLE-Routine (Xray Chest 1 View- PORTABLE-Routine in AM.) (08.24.21 @ 05:28) >  Unchanged bilateral opacities.    < end of copied text >      < from: Xray Chest 1 View- PORTABLE-Urgent (08.22.21 @ 15:25) >  INTERPRETATION:  Clinical History / Reason for exam: Shortness of breath and fever  Impression:  Diffuse bilateral opacities.                                            --------------------------------------------------------------    PHYSICAL EXAM:  GENERAL: NAD, lying in bed comfortably  HEAD:  Atraumatic, Normocephalic  EYES: EOMI, PERRLA, conjunctiva and sclera clear  NECK: Supple, No JVD  CHEST/LUNG: Decreased breath sounds bilaterally, mildly labored respirations  HEART: Regular rate and rhythm; No murmurs, rubs, or gallops  ABDOMEN:  Soft, Nontender, Nondistended  EXTREMITIES:  2+ Peripheral Pulses, brisk capillary refill. No clubbing, cyanosis, or edema  NERVOUS SYSTEM:  Alert & Oriented X3, speech clear. Facial deficit from [place]'s Palsy  MSK: FROM all 4 extremities, full and equal strength  SKIN: No rashes or lesions                                         --------------------------------------------------------------    ASSESSMENT & PLAN    Past medical history and hospital course     54 yo F with PMHx of [place] Palsy who tested + for COVID on 8/14 (unvaccianted) presented to ED 8/22/21 after having worsening shortness of breath and cough. Patient was admitted to MICU, now on high flow NC, CXR shows diffuse bilateral opacities. S/p 1 dose of toci and now on decadron 6q12. PCP prophylaxis for chronic steroid use.     # Acute hypoxic respiratory failure  # Severe COVID PNA  - S/p 1 dose tocilizumab 8/23/21  - Continue Decadron 6gIV q12  - Wean O2 as tolerated, 8/26/21 high flow decreased from 100-->80  - ID evaluation appreciated   - F/U TTE  - LE doppler: no DVT  - Inflammatory markers ferritin, procalcitonin, CRP, d-dimer, fungitell, BNP q48  - Repeat Chest XR every other AM (8/26/21 unchanged bilateral opacities)   - Educate patient to prone as much as possible (more encouragement given 8/26)    # Metabolic acidosis 2/2 Lactic acidosis  - Trend lactate  - Correct electrolytes as needed     # H/O [place]'s Palsy  - Stable  - Steroid use since 7/26/21, therefore starting PCP prophylaxis, atovaquone 1500 ml, bactrim contraindicated, sulfa allergy     # Major Depressive Disorder  - Continue home meds                                                                              ----------------------------------------------------  # DVT prophylaxis: Lovenox  # GI prophylaxis: Protonix  # Diet: Regular  # Code status: Full code  # Disposition: DANUTA                                                                           --------------------------------------------------------    # Handoff   Down grade to step down when oxygen needs decrease

## 2021-08-26 NOTE — PROGRESS NOTE ADULT - SUBJECTIVE AND OBJECTIVE BOX
Over Night Events: events noted on 60/80%, Afebrile    PHYSICAL EXAM    ICU Vital Signs Last 24 Hrs  T(C): 36.7 (26 Aug 2021 08:00), Max: 36.8 (25 Aug 2021 12:00)  T(F): 98 (26 Aug 2021 08:00), Max: 98.3 (25 Aug 2021 16:00)  HR: 74 (26 Aug 2021 08:00) (64 - 104)  BP: 118/78 (26 Aug 2021 07:00) (96/63 - 152/83)  BP(mean): 99 (26 Aug 2021 07:00) (63 - 115)  RR: 45 (26 Aug 2021 08:00) (21 - 45)  SpO2: 92% (26 Aug 2021 08:00) (83% - 99%)      General: ill looking  HEENT: LILY             Lymph Nodes: No cervical LN   Lungs: Bilateral rhconhi  Cardiovascular: Regular   Abdomen: Soft, Positive BS  Extremities: No clubbing   Skin: Warm  Neurological: Non focal       08-25-21 @ 07:01  -  08-26-21 @ 07:00  --------------------------------------------------------  IN:    Oral Fluid: 440 mL  Total IN: 440 mL    OUT:    Voided (mL): 1350 mL  Total OUT: 1350 mL    Total NET: -910 mL          LABS:                          12.7   12.21 )-----------( 486      ( 26 Aug 2021 05:08 )             38.6                                               08-26    136  |  98  |  25<H>  ----------------------------<  105<H>  5.3<H>   |  28  |  0.8    Ca    9.7      26 Aug 2021 05:08    TPro  5.5<L>  /  Alb  3.3<L>  /  TBili  0.3  /  DBili  x   /  AST  17  /  ALT  28  /  AlkPhos  50  08-26                                                 CARDIAC MARKERS ( 25 Aug 2021 05:31 )  x     / <0.01 ng/mL / 19 U/L / x     / x                                                LIVER FUNCTIONS - ( 26 Aug 2021 05:08 )  Alb: 3.3 g/dL / Pro: 5.5 g/dL / ALK PHOS: 50 U/L / ALT: 28 U/L / AST: 17 U/L / GGT: x                                                                                                                                       MEDICATIONS  (STANDING):  atovaquone  Suspension 1500 milliGRAM(s) Oral daily  buPROPion XL (24-Hour) . 150 milliGRAM(s) Oral daily  chlorhexidine 4% Liquid 1 Application(s) Topical <User Schedule>  dexAMETHasone  Injectable 6 milliGRAM(s) IV Push two times a day  DULoxetine 120 milliGRAM(s) Oral daily  enoxaparin Injectable 40 milliGRAM(s) SubCutaneous two times a day  pantoprazole    Tablet 40 milliGRAM(s) Oral before breakfast  polyethylene glycol 3350 17 Gram(s) Oral daily  senna 2 Tablet(s) Oral at bedtime  traZODone 50 milliGRAM(s) Oral at bedtime    CXR reviewed

## 2021-08-27 LAB
ALBUMIN SERPL ELPH-MCNC: 3.2 G/DL — LOW (ref 3.5–5.2)
ALP SERPL-CCNC: 56 U/L — SIGNIFICANT CHANGE UP (ref 30–115)
ALT FLD-CCNC: 30 U/L — SIGNIFICANT CHANGE UP (ref 0–41)
ANION GAP SERPL CALC-SCNC: 10 MMOL/L — SIGNIFICANT CHANGE UP (ref 7–14)
AST SERPL-CCNC: 24 U/L — SIGNIFICANT CHANGE UP (ref 0–41)
BASOPHILS # BLD AUTO: 0.03 K/UL — SIGNIFICANT CHANGE UP (ref 0–0.2)
BASOPHILS NFR BLD AUTO: 0.3 % — SIGNIFICANT CHANGE UP (ref 0–1)
BILIRUB SERPL-MCNC: 0.3 MG/DL — SIGNIFICANT CHANGE UP (ref 0.2–1.2)
BUN SERPL-MCNC: 24 MG/DL — HIGH (ref 10–20)
CALCIUM SERPL-MCNC: 9.4 MG/DL — SIGNIFICANT CHANGE UP (ref 8.5–10.1)
CHLORIDE SERPL-SCNC: 97 MMOL/L — LOW (ref 98–110)
CO2 SERPL-SCNC: 27 MMOL/L — SIGNIFICANT CHANGE UP (ref 17–32)
CREAT SERPL-MCNC: 0.5 MG/DL — LOW (ref 0.7–1.5)
CRP SERPL-MCNC: 13 MG/L — HIGH
D DIMER BLD IA.RAPID-MCNC: 361 NG/ML DDU — HIGH (ref 0–230)
EOSINOPHIL # BLD AUTO: 0.04 K/UL — SIGNIFICANT CHANGE UP (ref 0–0.7)
EOSINOPHIL NFR BLD AUTO: 0.4 % — SIGNIFICANT CHANGE UP (ref 0–8)
GLUCOSE SERPL-MCNC: 108 MG/DL — HIGH (ref 70–99)
HCT VFR BLD CALC: 39.2 % — SIGNIFICANT CHANGE UP (ref 37–47)
HGB BLD-MCNC: 12.8 G/DL — SIGNIFICANT CHANGE UP (ref 12–16)
IMM GRANULOCYTES NFR BLD AUTO: 6 % — HIGH (ref 0.1–0.3)
LYMPHOCYTES # BLD AUTO: 0.61 K/UL — LOW (ref 1.2–3.4)
LYMPHOCYTES # BLD AUTO: 5.9 % — LOW (ref 20.5–51.1)
MCHC RBC-ENTMCNC: 27.3 PG — SIGNIFICANT CHANGE UP (ref 27–31)
MCHC RBC-ENTMCNC: 32.7 G/DL — SIGNIFICANT CHANGE UP (ref 32–37)
MCV RBC AUTO: 83.6 FL — SIGNIFICANT CHANGE UP (ref 81–99)
MONOCYTES # BLD AUTO: 0.42 K/UL — SIGNIFICANT CHANGE UP (ref 0.1–0.6)
MONOCYTES NFR BLD AUTO: 4.1 % — SIGNIFICANT CHANGE UP (ref 1.7–9.3)
NEUTROPHILS # BLD AUTO: 8.64 K/UL — HIGH (ref 1.4–6.5)
NEUTROPHILS NFR BLD AUTO: 83.3 % — HIGH (ref 42.2–75.2)
NRBC # BLD: 0 /100 WBCS — SIGNIFICANT CHANGE UP (ref 0–0)
NT-PROBNP SERPL-SCNC: 133 PG/ML — SIGNIFICANT CHANGE UP (ref 0–300)
PLATELET # BLD AUTO: 464 K/UL — HIGH (ref 130–400)
POTASSIUM SERPL-MCNC: 4.9 MMOL/L — SIGNIFICANT CHANGE UP (ref 3.5–5)
POTASSIUM SERPL-SCNC: 4.9 MMOL/L — SIGNIFICANT CHANGE UP (ref 3.5–5)
PROT SERPL-MCNC: 5.3 G/DL — LOW (ref 6–8)
RBC # BLD: 4.69 M/UL — SIGNIFICANT CHANGE UP (ref 4.2–5.4)
RBC # FLD: 13.9 % — SIGNIFICANT CHANGE UP (ref 11.5–14.5)
SODIUM SERPL-SCNC: 134 MMOL/L — LOW (ref 135–146)
WBC # BLD: 10.36 K/UL — SIGNIFICANT CHANGE UP (ref 4.8–10.8)
WBC # FLD AUTO: 10.36 K/UL — SIGNIFICANT CHANGE UP (ref 4.8–10.8)

## 2021-08-27 PROCEDURE — 99232 SBSQ HOSP IP/OBS MODERATE 35: CPT

## 2021-08-27 RX ORDER — LACTULOSE 10 G/15ML
20 SOLUTION ORAL
Refills: 0 | Status: DISCONTINUED | OUTPATIENT
Start: 2021-08-27 | End: 2021-09-01

## 2021-08-27 RX ORDER — FUROSEMIDE 40 MG
40 TABLET ORAL DAILY
Refills: 0 | Status: DISCONTINUED | OUTPATIENT
Start: 2021-08-27 | End: 2021-08-28

## 2021-08-27 RX ADMIN — SENNA PLUS 2 TABLET(S): 8.6 TABLET ORAL at 21:16

## 2021-08-27 RX ADMIN — ENOXAPARIN SODIUM 40 MILLIGRAM(S): 100 INJECTION SUBCUTANEOUS at 05:59

## 2021-08-27 RX ADMIN — POLYETHYLENE GLYCOL 3350 17 GRAM(S): 17 POWDER, FOR SOLUTION ORAL at 11:36

## 2021-08-27 RX ADMIN — Medication 50 MILLIGRAM(S): at 21:18

## 2021-08-27 RX ADMIN — PANTOPRAZOLE SODIUM 40 MILLIGRAM(S): 20 TABLET, DELAYED RELEASE ORAL at 06:00

## 2021-08-27 RX ADMIN — Medication 6 MILLIGRAM(S): at 17:36

## 2021-08-27 RX ADMIN — ATOVAQUONE 1500 MILLIGRAM(S): 750 SUSPENSION ORAL at 11:36

## 2021-08-27 RX ADMIN — Medication 6 MILLIGRAM(S): at 05:59

## 2021-08-27 RX ADMIN — SODIUM ZIRCONIUM CYCLOSILICATE 5 GRAM(S): 10 POWDER, FOR SUSPENSION ORAL at 07:40

## 2021-08-27 RX ADMIN — BUPROPION HYDROCHLORIDE 150 MILLIGRAM(S): 150 TABLET, EXTENDED RELEASE ORAL at 11:55

## 2021-08-27 RX ADMIN — ENOXAPARIN SODIUM 40 MILLIGRAM(S): 100 INJECTION SUBCUTANEOUS at 17:37

## 2021-08-27 RX ADMIN — CHLORHEXIDINE GLUCONATE 1 APPLICATION(S): 213 SOLUTION TOPICAL at 06:00

## 2021-08-27 RX ADMIN — LACTULOSE 20 GRAM(S): 10 SOLUTION ORAL at 17:36

## 2021-08-27 RX ADMIN — DULOXETINE HYDROCHLORIDE 120 MILLIGRAM(S): 30 CAPSULE, DELAYED RELEASE ORAL at 11:40

## 2021-08-27 RX ADMIN — Medication 40 MILLIGRAM(S): at 11:35

## 2021-08-27 NOTE — PROGRESS NOTE ADULT - ASSESSMENT
IMPRESSION:    Acute Hypoxic Respiratory Failure on HHFNC 60/80  Severe COVID pneumonia   Hyponatremia improved  HO [place] Palsy    PLAN:    CNS:  Avoid CNS depressants.      HEENT: Oral care    PULMONARY:  HOB @45 degrees.  HFNC.  Wean O2 as tolerated.  Dexa 6mg IV q 12h; incentive spirometry    CARDIOVASCULAR:  KEEP EQUAL, LASIX X1    GI: GI prophylaxis. Diet as tolerated    RENAL:  Follow up lytes.  Correct as needed.      INFECTIOUS DISEASE: Follow up cultures. sp toci, , trend markers    HEMATOLOGICAL:  DVT prophylaxis.     ENDOCRINE:  Follow up FS.  Insulin protocol if needed.    MUSCULOSKELETAL:  Ambulate as tolerated    MICU

## 2021-08-27 NOTE — PROGRESS NOTE ADULT - SUBJECTIVE AND OBJECTIVE BOX
Over Night Events: events noted on HHFNC/ BIPAP overnight      PHYSICAL EXAM    ICU Vital Signs Last 24 Hrs  T(C): 36 (27 Aug 2021 00:00), Max: 37.6 (26 Aug 2021 12:00)  T(F): 96.8 (27 Aug 2021 00:00), Max: 99.6 (26 Aug 2021 12:00)  HR: 70 (27 Aug 2021 08:00) (68 - 90)  BP: 136/71 (27 Aug 2021 08:00) (103/54 - 145/74)  BP(mean): 95 (27 Aug 2021 08:00) (73 - 107)  ABP: --  ABP(mean): --  RR: 24 (27 Aug 2021 08:00) (19 - 32)  SpO2: 96% (27 Aug 2021 08:00) (90% - 100%)      General: ill looking  HEENT: LILY             Lymph Nodes: No cervical LN   Lungs: Bilateral rhonchi  Cardiovascular: Regular   Abdomen: Soft, Positive BS  Extremities: No clubbing   Skin: Warm  Neurological: Non focal       08-26-21 @ 07:01  -  08-27-21 @ 07:00  --------------------------------------------------------  IN:    Oral Fluid: 120 mL  Total IN: 120 mL    OUT:    Voided (mL): 800 mL  Total OUT: 800 mL    Total NET: -680 mL          LABS:                          12.8   10.36 )-----------( 464      ( 27 Aug 2021 05:13 )             39.2                                               08-27    134<L>  |  97<L>  |  24<H>  ----------------------------<  108<H>  4.9   |  27  |  0.5<L>    Ca    9.4      27 Aug 2021 05:13    TPro  5.3<L>  /  Alb  3.2<L>  /  TBili  0.3  /  DBili  x   /  AST  24  /  ALT  30  /  AlkPhos  56  08-27                                                                                           LIVER FUNCTIONS - ( 27 Aug 2021 05:13 )  Alb: 3.2 g/dL / Pro: 5.3 g/dL / ALK PHOS: 56 U/L / ALT: 30 U/L / AST: 24 U/L / GGT: x                                                                                                                                       MEDICATIONS  (STANDING):  atovaquone  Suspension 1500 milliGRAM(s) Oral daily  buPROPion XL (24-Hour) . 150 milliGRAM(s) Oral daily  chlorhexidine 4% Liquid 1 Application(s) Topical <User Schedule>  dexAMETHasone  Injectable 6 milliGRAM(s) IV Push two times a day  DULoxetine 120 milliGRAM(s) Oral daily  enoxaparin Injectable 40 milliGRAM(s) SubCutaneous two times a day  pantoprazole    Tablet 40 milliGRAM(s) Oral before breakfast  polyethylene glycol 3350 17 Gram(s) Oral daily  senna 2 Tablet(s) Oral at bedtime  traZODone 50 milliGRAM(s) Oral at bedtime    cxr reviewed

## 2021-08-27 NOTE — PROGRESS NOTE ADULT - SUBJECTIVE AND OBJECTIVE BOX
KEN QUIROS  55y, Female    All available historical data reviewed    OVERNIGHT EVENTS:  no fevers  HFNC  feels well and has no new complaints     ROS:  General: Denies rigors, nightsweats  HEENT: Denies headache, rhinorrhea, sore throat, eye pain  CV: Denies CP, palpitations  PULM: Denies wheezing, hemoptysis  GI: Denies hematemesis, hematochezia, melena  : Denies discharge, hematuria  MSK: Denies arthralgias, myalgias  SKIN: Denies rash, lesions  NEURO: Denies paresthesias, weakness  PSYCH: Denies depression, anxiety    VITALS:  T(F): 98.5, Max: 98.6 (08-26-21 @ 16:00)  HR: 96  BP: 94/55  RR: 25Vital Signs Last 24 Hrs  T(C): 36.9 (27 Aug 2021 13:00), Max: 37 (26 Aug 2021 16:00)  T(F): 98.5 (27 Aug 2021 13:00), Max: 98.6 (26 Aug 2021 16:00)  HR: 96 (27 Aug 2021 13:00) (68 - 100)  BP: 94/55 (27 Aug 2021 13:00) (94/55 - 145/74)  BP(mean): 78 (27 Aug 2021 13:00) (67 - 107)  RR: 25 (27 Aug 2021 13:00) (19 - 32)  SpO2: 95% (27 Aug 2021 13:00) (88% - 100%)    TESTS & MEASUREMENTS:                        12.8   10.36 )-----------( 464      ( 27 Aug 2021 05:13 )             39.2     08-27    134<L>  |  97<L>  |  24<H>  ----------------------------<  108<H>  4.9   |  27  |  0.5<L>    Ca    9.4      27 Aug 2021 05:13    TPro  5.3<L>  /  Alb  3.2<L>  /  TBili  0.3  /  DBili  x   /  AST  24  /  ALT  30  /  AlkPhos  56  08-27    LIVER FUNCTIONS - ( 27 Aug 2021 05:13 )  Alb: 3.2 g/dL / Pro: 5.3 g/dL / ALK PHOS: 56 U/L / ALT: 30 U/L / AST: 24 U/L / GGT: x             Culture - Blood (collected 08-22-21 @ 21:36)  Source: .Blood None  Preliminary Report (08-24-21 @ 07:01):    No growth to date.            RADIOLOGY & ADDITIONAL TESTS:  Personal review of radiological diagnostics performed  Echo and EKG results noted when applicable.     MEDICATIONS:  atovaquone  Suspension 1500 milliGRAM(s) Oral daily  buPROPion XL (24-Hour) . 150 milliGRAM(s) Oral daily  chlorhexidine 4% Liquid 1 Application(s) Topical <User Schedule>  dexAMETHasone  Injectable 6 milliGRAM(s) IV Push two times a day  DULoxetine 120 milliGRAM(s) Oral daily  enoxaparin Injectable 40 milliGRAM(s) SubCutaneous two times a day  furosemide   Injectable 40 milliGRAM(s) IV Push daily  lactulose Syrup 20 Gram(s) Oral two times a day  pantoprazole    Tablet 40 milliGRAM(s) Oral before breakfast  polyethylene glycol 3350 17 Gram(s) Oral daily  senna 2 Tablet(s) Oral at bedtime  traZODone 50 milliGRAM(s) Oral at bedtime      ANTIBIOTICS:  atovaquone  Suspension 1500 milliGRAM(s) Oral daily

## 2021-08-27 NOTE — PROGRESS NOTE ADULT - ASSESSMENT
· Assessment	  56 yo female w/ PMHx significant only for recent dx of [place] Palsy.   Diagnosed w/ COVID(+) on 8/14 after having SOB, weakness since 8/11. Patient is unvaccinated.   Presented to ED for worsening SOB & Cough.    IMPRESSION;  COVID 19 with severe illness. SpO2 < 94% on RA and need for supplemental O2.  Pt is in the late inflammatory response phase ot the illness based on the onset of symptoms.  Clinically although on HFNC appears comfortable  CXR scattered opacities  Ddimers 348 > 290  Ferritin 470    procal 0.05  8/23 Duplex LEs : no DVT     RECOMMENDATIONS;  Target SpO2 92 % to 96 %  Dexamethasone 6 mg iv q24h for 10 days.  Monitor for side effects: hyperglycemia, neurological ( agitation/confusion), adrenal suppression, bacterial and fungal infections  Anticoagulation as per team.   recall prn please

## 2021-08-27 NOTE — PHARMACOTHERAPY INTERVENTION NOTE - [facility]SP[facility]AR[facility]MMED[facility]FE
Allergy reviewed/ verified - Prescriber aware; Therapy continued
Allergy prevented - Therapy discontinued/changed

## 2021-08-27 NOTE — PROGRESS NOTE ADULT - SUBJECTIVE AND OBJECTIVE BOX
KEN QUIROS 55y Female  MRN#: [id number]   CODE STATUS: Full code    Hospital Day: 5d    Pt is currently admitted with the primary diagnosis of COVID PNA    SUBJECTIVE  Hospital Course  56 yo F who tested + for COVID on 8/14 (unvaccianted) presented to ED 8/22/21 after having worsening shortness of breath and cough. Patient was admitted to MICU, now on high flow NC, CXR shows diffuse bilateral opacities. S/p 1 dose of toci, started decadron 6q12 on 8/23/21. Patient has been on steroids since 7/26 for [place] Palsy, therefore we started atovaquone 1500 ml per day for PCP prophylaxis. Patient stable in MICU, high flow decreased since yesterday, inflammatory markers all trending down. Will start a trial a Lasix to make patient fluid negative.    Overnight events   None  Subjective complaints   None                                          ----------------------------------------------------------  OBJECTIVE  PAST MEDICAL & SURGICAL HISTORY                                            -----------------------------------------------------------  ALLERGIES:  codeine (Unknown)  erythromycin (Unknown)  sulfa drugs (Unknown)                                            ------------------------------------------------------------    HOME MEDICATIONS  Home Medications:  buPROPion 150 mg/12 hours (SR) oral tablet, extended release: 1 tab(s) orally once a day (23 Aug 2021 09:13)  DULoxetine 60 mg oral delayed release capsule: 2 tab(s) orally once a day (23 Aug 2021 09:13)                           MEDICATIONS:  MEDICATIONS  (STANDING):  atovaquone  Suspension 1500 milliGRAM(s) Oral daily  buPROPion XL (24-Hour) . 150 milliGRAM(s) Oral daily  chlorhexidine 4% Liquid 1 Application(s) Topical <User Schedule>  dexAMETHasone  Injectable 6 milliGRAM(s) IV Push two times a day  DULoxetine 120 milliGRAM(s) Oral daily  enoxaparin Injectable 40 milliGRAM(s) SubCutaneous two times a day  furosemide   Injectable 40 milliGRAM(s) IV Push daily  lactulose Syrup 20 Gram(s) Oral two times a day  pantoprazole    Tablet 40 milliGRAM(s) Oral before breakfast  polyethylene glycol 3350 17 Gram(s) Oral daily  senna 2 Tablet(s) Oral at bedtime  traZODone 50 milliGRAM(s) Oral at bedtime    MEDICATIONS  (PRN):                                            ------------------------------------------------------------  VITAL SIGNS: Last 24 Hours  ICU Vital Signs Last 24 Hrs  T(C): 36.8 (27 Aug 2021 09:00), Max: 37.6 (26 Aug 2021 12:00)  T(F): 98.2 (27 Aug 2021 09:00), Max: 99.6 (26 Aug 2021 12:00)  HR: 94 (27 Aug 2021 10:00) (68 - 94)  BP: 116/62 (27 Aug 2021 10:00) (103/54 - 145/74)  BP(mean): 88 (27 Aug 2021 10:00) (73 - 107)  ABP: --  ABP(mean): --  RR: 32 (27 Aug 2021 10:00) (19 - 32)  SpO2: 94% (27 Aug 2021 10:00) (90% - 100%)                                               --------------------------------------------------------------  LABS:    cret                        12.8   10.36 )-----------( 464      ( 27 Aug 2021 05:13 )             39.2     08-27    134<L>  |  97<L>  |  24<H>  ----------------------------<  108<H>  4.9   |  27  |  0.5<L>    Ca    9.4      27 Aug 2021 05:13    TPro  5.3<L>  /  Alb  3.2<L>  /  TBili  0.3  /  DBili  x   /  AST  24  /  ALT  30  /  AlkPhos  56  08-27                                            -------------------------------------------------------------  RADIOLOGY:  < from: Xray Chest 1 View- PORTABLE-Routine (Xray Chest 1 View- PORTABLE-Routine in AM.) (08.26.21 @ 06:25) >  Diffuse bilateral opacities, unchanged.    < end of copied text >  < from: Xray Chest 1 View- PORTABLE-Routine (Xray Chest 1 View- PORTABLE-Routine in AM.) (08.24.21 @ 05:28) >  Unchanged bilateral opacities.    < end of copied text >      < from: VA Duplex Lower Ext Vein Scan, Bilat (08.23.21 @ 11:37) >  No evidence of deep venous thrombosis or superficial thrombophlebitis in the bilateral lower extremities.    < end of copied text >  < from: Xray Chest 1 View- PORTABLE-Routine (Xray Chest 1 View- PORTABLE-Routine in AM.) (08.24.21 @ 05:28) >  Unchanged bilateral opacities.    < end of copied text >      < from: Xray Chest 1 View- PORTABLE-Urgent (08.22.21 @ 15:25) >  INTERPRETATION:  Clinical History / Reason for exam: Shortness of breath and fever  Impression:  Diffuse bilateral opacities.                                            --------------------------------------------------------------    PHYSICAL EXAM:  GENERAL: NAD, lying in bed comfortably  HEAD:  Atraumatic, Normocephalic  EYES: EOMI, PERRLA, conjunctiva and sclera clear  NECK: Supple, No JVD  CHEST/LUNG: Decreased breath sounds bilaterally, mildly labored respirations  HEART: Regular rate and rhythm; No murmurs, rubs, or gallops  ABDOMEN:  Soft, Nontender, Nondistended  EXTREMITIES:  2+ Peripheral Pulses, brisk capillary refill. No clubbing, cyanosis, or edema  NERVOUS SYSTEM:  Alert & Oriented X3, speech clear. Facial deficit from Bell's Palsy  MSK: FROM all 4 extremities, full and equal strength  SKIN: No rashes or lesions                                         --------------------------------------------------------------    ASSESSMENT & PLAN    Past medical history and hospital course     56 yo F with PMHx of Bell's Palsy who tested + for COVID on 8/14 (unvaccianted) presented to ED 8/22/21 after having worsening shortness of breath and cough. Patient was admitted to MICU, now on high flow NC, CXR shows diffuse bilateral opacities. S/p 1 dose of toci and now on decadron 6q12. PCP prophylaxis for chronic steroid use. Starting lasix for neg fluid balance.    # Acute hypoxic respiratory failure  # Severe COVID PNA  - S/p 1 dose tocilizumab 8/23/21  - Continue Decadron 6gIV q12  - Wean O2 as tolerated, 8/26/21 high flow decreased from 100-->80  - ID evaluation appreciated   - F/U TTE  - LE doppler: no DVT  - Inflammatory markers ferritin, procalcitonin, CRP, d-dimer, fungitell, BNP q48  - Repeat Chest XR every other AM (8/26/21 unchanged bilateral opacities)   - Educate patient to prone as much as possible (more encouragement given 8/26)  - Start Lasix 8/27/21    # Metabolic acidosis 2/2 Lactic acidosis  - Trend lactate  - Correct electrolytes as needed     # H/O Bell's Palsy  - Stable  - Steroid use since 7/26/21, PCP prophylaxis atovaquone 1500 ml     # Major Depressive Disorder  - Continue home meds                                                                              ----------------------------------------------------  # DVT prophylaxis: Lovenox  # GI prophylaxis: Protonix  # Diet: Regular  # Code status: Full code  # Disposition: DANUTA                                                                           --------------------------------------------------------    # Handoff   Down grade to step down when oxygen needs decrease

## 2021-08-28 LAB
ALBUMIN SERPL ELPH-MCNC: 3.3 G/DL — LOW (ref 3.5–5.2)
ALP SERPL-CCNC: 54 U/L — SIGNIFICANT CHANGE UP (ref 30–115)
ALT FLD-CCNC: 37 U/L — SIGNIFICANT CHANGE UP (ref 0–41)
ANION GAP SERPL CALC-SCNC: 11 MMOL/L — SIGNIFICANT CHANGE UP (ref 7–14)
AST SERPL-CCNC: 30 U/L — SIGNIFICANT CHANGE UP (ref 0–41)
BASOPHILS # BLD AUTO: 0.06 K/UL — SIGNIFICANT CHANGE UP (ref 0–0.2)
BASOPHILS NFR BLD AUTO: 0.4 % — SIGNIFICANT CHANGE UP (ref 0–1)
BILIRUB SERPL-MCNC: 0.4 MG/DL — SIGNIFICANT CHANGE UP (ref 0.2–1.2)
BUN SERPL-MCNC: 31 MG/DL — HIGH (ref 10–20)
CALCIUM SERPL-MCNC: 9.5 MG/DL — SIGNIFICANT CHANGE UP (ref 8.5–10.1)
CHLORIDE SERPL-SCNC: 95 MMOL/L — LOW (ref 98–110)
CO2 SERPL-SCNC: 29 MMOL/L — SIGNIFICANT CHANGE UP (ref 17–32)
CREAT SERPL-MCNC: 0.7 MG/DL — SIGNIFICANT CHANGE UP (ref 0.7–1.5)
CULTURE RESULTS: SIGNIFICANT CHANGE UP
EOSINOPHIL # BLD AUTO: 0.37 K/UL — SIGNIFICANT CHANGE UP (ref 0–0.7)
EOSINOPHIL NFR BLD AUTO: 2.7 % — SIGNIFICANT CHANGE UP (ref 0–8)
GLUCOSE SERPL-MCNC: 82 MG/DL — SIGNIFICANT CHANGE UP (ref 70–99)
HCT VFR BLD CALC: 41.7 % — SIGNIFICANT CHANGE UP (ref 37–47)
HGB BLD-MCNC: 13.5 G/DL — SIGNIFICANT CHANGE UP (ref 12–16)
IMM GRANULOCYTES NFR BLD AUTO: 5.5 % — HIGH (ref 0.1–0.3)
LYMPHOCYTES # BLD AUTO: 1.33 K/UL — SIGNIFICANT CHANGE UP (ref 1.2–3.4)
LYMPHOCYTES # BLD AUTO: 9.6 % — LOW (ref 20.5–51.1)
MCHC RBC-ENTMCNC: 27.3 PG — SIGNIFICANT CHANGE UP (ref 27–31)
MCHC RBC-ENTMCNC: 32.4 G/DL — SIGNIFICANT CHANGE UP (ref 32–37)
MCV RBC AUTO: 84.4 FL — SIGNIFICANT CHANGE UP (ref 81–99)
MONOCYTES # BLD AUTO: 0.94 K/UL — HIGH (ref 0.1–0.6)
MONOCYTES NFR BLD AUTO: 6.8 % — SIGNIFICANT CHANGE UP (ref 1.7–9.3)
NEUTROPHILS # BLD AUTO: 10.34 K/UL — HIGH (ref 1.4–6.5)
NEUTROPHILS NFR BLD AUTO: 75 % — SIGNIFICANT CHANGE UP (ref 42.2–75.2)
NRBC # BLD: 0 /100 WBCS — SIGNIFICANT CHANGE UP (ref 0–0)
PLATELET # BLD AUTO: 439 K/UL — HIGH (ref 130–400)
POTASSIUM SERPL-MCNC: 4.4 MMOL/L — SIGNIFICANT CHANGE UP (ref 3.5–5)
POTASSIUM SERPL-SCNC: 4.4 MMOL/L — SIGNIFICANT CHANGE UP (ref 3.5–5)
PROCALCITONIN SERPL-MCNC: 0.02 NG/ML — SIGNIFICANT CHANGE UP (ref 0.02–0.1)
PROT SERPL-MCNC: 5.5 G/DL — LOW (ref 6–8)
RBC # BLD: 4.94 M/UL — SIGNIFICANT CHANGE UP (ref 4.2–5.4)
RBC # FLD: 14.2 % — SIGNIFICANT CHANGE UP (ref 11.5–14.5)
SODIUM SERPL-SCNC: 135 MMOL/L — SIGNIFICANT CHANGE UP (ref 135–146)
SPECIMEN SOURCE: SIGNIFICANT CHANGE UP
WBC # BLD: 13.8 K/UL — HIGH (ref 4.8–10.8)
WBC # FLD AUTO: 13.8 K/UL — HIGH (ref 4.8–10.8)

## 2021-08-28 PROCEDURE — 99232 SBSQ HOSP IP/OBS MODERATE 35: CPT

## 2021-08-28 PROCEDURE — 71045 X-RAY EXAM CHEST 1 VIEW: CPT | Mod: 26

## 2021-08-28 RX ORDER — SODIUM CHLORIDE 9 MG/ML
500 INJECTION INTRAMUSCULAR; INTRAVENOUS; SUBCUTANEOUS ONCE
Refills: 0 | Status: COMPLETED | OUTPATIENT
Start: 2021-08-28 | End: 2021-08-28

## 2021-08-28 RX ORDER — MIDODRINE HYDROCHLORIDE 2.5 MG/1
5 TABLET ORAL EVERY 8 HOURS
Refills: 0 | Status: DISCONTINUED | OUTPATIENT
Start: 2021-08-28 | End: 2021-08-28

## 2021-08-28 RX ORDER — SODIUM CHLORIDE 9 MG/ML
500 INJECTION INTRAMUSCULAR; INTRAVENOUS; SUBCUTANEOUS ONCE
Refills: 0 | Status: DISCONTINUED | OUTPATIENT
Start: 2021-08-28 | End: 2021-08-28

## 2021-08-28 RX ORDER — MIDODRINE HYDROCHLORIDE 2.5 MG/1
5 TABLET ORAL EVERY 8 HOURS
Refills: 0 | Status: DISCONTINUED | OUTPATIENT
Start: 2021-08-28 | End: 2021-08-31

## 2021-08-28 RX ADMIN — ENOXAPARIN SODIUM 40 MILLIGRAM(S): 100 INJECTION SUBCUTANEOUS at 17:57

## 2021-08-28 RX ADMIN — DULOXETINE HYDROCHLORIDE 120 MILLIGRAM(S): 30 CAPSULE, DELAYED RELEASE ORAL at 13:35

## 2021-08-28 RX ADMIN — BUPROPION HYDROCHLORIDE 150 MILLIGRAM(S): 150 TABLET, EXTENDED RELEASE ORAL at 13:35

## 2021-08-28 RX ADMIN — LACTULOSE 20 GRAM(S): 10 SOLUTION ORAL at 17:57

## 2021-08-28 RX ADMIN — LACTULOSE 20 GRAM(S): 10 SOLUTION ORAL at 05:30

## 2021-08-28 RX ADMIN — ENOXAPARIN SODIUM 40 MILLIGRAM(S): 100 INJECTION SUBCUTANEOUS at 05:30

## 2021-08-28 RX ADMIN — ATOVAQUONE 1500 MILLIGRAM(S): 750 SUSPENSION ORAL at 12:22

## 2021-08-28 RX ADMIN — Medication 6 MILLIGRAM(S): at 17:57

## 2021-08-28 RX ADMIN — PANTOPRAZOLE SODIUM 40 MILLIGRAM(S): 20 TABLET, DELAYED RELEASE ORAL at 05:30

## 2021-08-28 RX ADMIN — Medication 50 MILLIGRAM(S): at 21:46

## 2021-08-28 RX ADMIN — MIDODRINE HYDROCHLORIDE 5 MILLIGRAM(S): 2.5 TABLET ORAL at 10:35

## 2021-08-28 RX ADMIN — SODIUM CHLORIDE 1000 MILLILITER(S): 9 INJECTION INTRAMUSCULAR; INTRAVENOUS; SUBCUTANEOUS at 10:36

## 2021-08-28 RX ADMIN — SENNA PLUS 2 TABLET(S): 8.6 TABLET ORAL at 21:45

## 2021-08-28 RX ADMIN — POLYETHYLENE GLYCOL 3350 17 GRAM(S): 17 POWDER, FOR SOLUTION ORAL at 12:22

## 2021-08-28 RX ADMIN — Medication 6 MILLIGRAM(S): at 05:30

## 2021-08-28 RX ADMIN — CHLORHEXIDINE GLUCONATE 1 APPLICATION(S): 213 SOLUTION TOPICAL at 05:29

## 2021-08-28 NOTE — PHARMACOTHERAPY INTERVENTION NOTE - INTERVENTION TYPE RECOOMEND
Dose Optimization/Non-renal Dose Adjustment
Route of Administration Change
Dose Optimization/Non-renal Dose Adjustment

## 2021-08-28 NOTE — PROGRESS NOTE ADULT - SUBJECTIVE AND OBJECTIVE BOX
HPI:  56 yo female w/ PMHx significant only for recent dx of [place] Palsy.   Diagnosed w/ COVID(+) on 8/14 after having SOB. Patient is unvaccinated.   Presented to ED for worsening SOB & Cough.    Denies chest pain, abdominal pain, N/V.     ED: Patient was tachypnic & tachycardic desating to 85% on RA>> HFNC 50/60 sating 92%  Labs: DDimer 348, , AG 23, Lactate 2.1;   CXR: Diffuse b/l opacities;  EKG: Sinus Tachy    Admit to MICU for monitoring    (22 Aug 2021 18:15)        Over Night Events:  Patient seen and examined.       ROS:  See HPI    PHYSICAL EXAM    ICU Vital Signs Last 24 Hrs  T(C): 36.3 (28 Aug 2021 04:00), Max: 37 (27 Aug 2021 17:00)  T(F): 97.3 (28 Aug 2021 04:00), Max: 98.6 (27 Aug 2021 17:00)  HR: 68 (28 Aug 2021 07:10) (64 - 111)  BP: 91/47 (28 Aug 2021 07:10) (76/46 - 118/61)  BP(mean): 63 (28 Aug 2021 07:10) (56 - 90)  ABP: --  ABP(mean): --  RR: 24 (28 Aug 2021 07:10) (17 - 40)  SpO2: 96% (28 Aug 2021 07:10) (88% - 100%)      General:  HEENT:                Lymph Nodes: NO cervical LN   Lungs: Bilateral BS  Cardiovascular: Regular   Abdomen: Soft, Positive BS  Extremities: No clubbing   Skin:   Neurological:     I&O's Detail    27 Aug 2021 07:01  -  28 Aug 2021 07:00  --------------------------------------------------------  IN:    Oral Fluid: 920 mL  Total IN: 920 mL    OUT:    Voided (mL): 2800 mL  Total OUT: 2800 mL    Total NET: -1880 mL          LABS:                          13.5   13.80 )-----------( 439      ( 28 Aug 2021 05:03 )             41.7         28 Aug 2021 05:03    135    |  95     |  31     ----------------------------<  82     4.4     |  29     |  0.7      Ca    9.5        28 Aug 2021 05:03    TPro  5.5    /  Alb  3.3    /  TBili  0.4    /  DBili  x      /  AST  30     /  ALT  37     /  AlkPhos  54     28 Aug 2021 05:03  Amylase x     lipase x                                                                                                                                                                                                                                         MEDICATIONS  (STANDING):  atovaquone  Suspension 1500 milliGRAM(s) Oral daily  buPROPion XL (24-Hour) . 150 milliGRAM(s) Oral daily  chlorhexidine 4% Liquid 1 Application(s) Topical <User Schedule>  dexAMETHasone  Injectable 6 milliGRAM(s) IV Push two times a day  DULoxetine 120 milliGRAM(s) Oral daily  enoxaparin Injectable 40 milliGRAM(s) SubCutaneous two times a day  furosemide   Injectable 40 milliGRAM(s) IV Push daily  lactulose Syrup 20 Gram(s) Oral two times a day  pantoprazole    Tablet 40 milliGRAM(s) Oral before breakfast  polyethylene glycol 3350 17 Gram(s) Oral daily  senna 2 Tablet(s) Oral at bedtime  sodium chloride 0.9% Bolus 500 milliLiter(s) IV Bolus once  traZODone 50 milliGRAM(s) Oral at bedtime    MEDICATIONS  (PRN):  midodrine 5 milliGRAM(s) Oral every 8 hours PRN for MAP <60          Xrays:  TLC:  OG:  ET tube:                                                                                       ECHO:    IMPRESSION:      PLAN:    CNS:    HEENT:    PULMONARY:    CARDIOVASCULAR:    GI: GI prophylaxis.  Feeding     RENAL:    INFECTIOUS DISEASE:    HEMATOLOGICAL:  DVT prophylaxis.    ENDOCRINE:  Follow up FS.  Insulin protocol if needed.    MUSCULOSKELETAL:      45 minutes of critical care time spent providing medical care for patient's acute illness/conditions that impairs at least one vital organ system and/or poses a high risk of imminent or life threatening deterioration in the patient's condition. It includes time spent evaluating and treating the patient's acute illness as well as time spent reviewing labs, radiology, discussing goals of care with patient and/or patient's family, and discussing the case with a multidisciplinary team in an effort to prevent further life threatening deterioration or end organ damage. This time is independent of any procedures performed.       HPI:  54 yo female w/ PMHx significant only for recent dx of [place] Palsy.   Diagnosed w/ COVID(+) on 8/14 after having SOB. Patient is unvaccinated.   Presented to ED for worsening SOB & Cough.    Denies chest pain, abdominal pain, N/V.     ED: Patient was tachypnic & tachycardic desating to 85% on RA>> HFNC 50/60 sating 92%  Labs: DDimer 348, , AG 23, Lactate 2.1;   CXR: Diffuse b/l opacities;  EKG: Sinus Tachy    Admit to MICU for monitoring    (22 Aug 2021 18:15)        Over Night Events:  Patient seen and examined.   remains on HF O2    ROS:  See HPI    PHYSICAL EXAM    ICU Vital Signs Last 24 Hrs  T(C): 36.3 (28 Aug 2021 04:00), Max: 37 (27 Aug 2021 17:00)  T(F): 97.3 (28 Aug 2021 04:00), Max: 98.6 (27 Aug 2021 17:00)  HR: 68 (28 Aug 2021 07:10) (64 - 111)  BP: 91/47 (28 Aug 2021 07:10) (76/46 - 118/61)  BP(mean): 63 (28 Aug 2021 07:10) (56 - 90)  ABP: --  ABP(mean): --  RR: 24 (28 Aug 2021 07:10) (17 - 40)  SpO2: 96% (28 Aug 2021 07:10) (88% - 100%)      General:  HEENT:                Lymph Nodes: NO cervical LN   Lungs: Bilateral BS  Cardiovascular: Regular   Abdomen: Soft, Positive BS  Extremities: No clubbing   Skin: intact  Neurological: grossly intact    I&O's Detail    27 Aug 2021 07:01  -  28 Aug 2021 07:00  --------------------------------------------------------  IN:    Oral Fluid: 920 mL  Total IN: 920 mL    OUT:    Voided (mL): 2800 mL  Total OUT: 2800 mL    Total NET: -1880 mL          LABS:                          13.5   13.80 )-----------( 439      ( 28 Aug 2021 05:03 )             41.7         28 Aug 2021 05:03    135    |  95     |  31     ----------------------------<  82     4.4     |  29     |  0.7      Ca    9.5        28 Aug 2021 05:03    TPro  5.5    /  Alb  3.3    /  TBili  0.4    /  DBili  x      /  AST  30     /  ALT  37     /  AlkPhos  54     28 Aug 2021 05:03  Amylase x     lipase x                                                                                                                                                                                                                                         MEDICATIONS  (STANDING):  atovaquone  Suspension 1500 milliGRAM(s) Oral daily  buPROPion XL (24-Hour) . 150 milliGRAM(s) Oral daily  chlorhexidine 4% Liquid 1 Application(s) Topical <User Schedule>  dexAMETHasone  Injectable 6 milliGRAM(s) IV Push two times a day  DULoxetine 120 milliGRAM(s) Oral daily  enoxaparin Injectable 40 milliGRAM(s) SubCutaneous two times a day  furosemide   Injectable 40 milliGRAM(s) IV Push daily  lactulose Syrup 20 Gram(s) Oral two times a day  pantoprazole    Tablet 40 milliGRAM(s) Oral before breakfast  polyethylene glycol 3350 17 Gram(s) Oral daily  senna 2 Tablet(s) Oral at bedtime  sodium chloride 0.9% Bolus 500 milliLiter(s) IV Bolus once  traZODone 50 milliGRAM(s) Oral at bedtime    MEDICATIONS  (PRN):  midodrine 5 milliGRAM(s) Oral every 8 hours PRN for MAP <60          Xrays:  unchanged                                                                                  ECHO:    IMPRESSION:  acute hypoxemic respiratory failure  hypotension secondary to diuresis    PLAN:    CNS: no sedation, on home psych meds    HEENT: oral care    PULMONARY: wean O2    CARDIOVASCULAR: hypotension requires fluid bolus    GI: GI prophylaxis.  Feeding     RENAL: FU Lytes    INFECTIOUS DISEASE: on atovaquone PCP prophylaxis, chronic steroids for [place] Palsy    HEMATOLOGICAL:  DVT prophylaxis.    ENDOCRINE:  Follow up FS.  Insulin protocol if needed.    MUSCULOSKELETAL: OOB       45 minutes of critical care time spent providing medical care for patient's acute illness/conditions that impairs at least one vital organ system and/or poses a high risk of imminent or life threatening deterioration in the patient's condition. It includes time spent evaluating and treating the patient's acute illness as well as time spent reviewing labs, radiology, discussing goals of care with patient and/or patient's family, and discussing the case with a multidisciplinary team in an effort to prevent further life threatening deterioration or end organ damage. This time is independent of any procedures performed.

## 2021-08-28 NOTE — PROGRESS NOTE ADULT - SUBJECTIVE AND OBJECTIVE BOX
KEN QUIROS 55y Female  MRN#: [id number]   CODE STATUS: Full code    Hospital Day: 6d    Pt is currently admitted with the primary diagnosis of COVID PNA    SUBJECTIVE  Hospital Course  56 yo F who tested + for COVID on 8/14 (unvaccianted) presented to ED 8/22/21 after having worsening shortness of breath and cough. Patient was admitted to MICU, now on high flow NC, CXR shows diffuse bilateral opacities. S/p 1 dose of toci, started decadron 6q12 on 8/23/21. Patient has been on steroids since 7/26 for [place] Palsy, therefore we started atovaquone 1500 ml per day for PCP prophylaxis. Patient stable in MICU, high flow decreased since yesterday, inflammatory markers all trending down. Lasix made patient hypotensive, so we DC'd, gave two boluses.    Overnight events   None  Subjective complaints   None                                          ----------------------------------------------------------  OBJECTIVE  PAST MEDICAL & SURGICAL HISTORY                                            -----------------------------------------------------------  ALLERGIES:  codeine (Unknown)  erythromycin (Unknown)  sulfa drugs (Unknown)                                            ------------------------------------------------------------    HOME MEDICATIONS  Home Medications:  buPROPion 150 mg/12 hours (SR) oral tablet, extended release: 1 tab(s) orally once a day (23 Aug 2021 09:13)  DULoxetine 60 mg oral delayed release capsule: 2 tab(s) orally once a day (23 Aug 2021 09:13)                           MEDICATIONS:  MEDICATIONS  (STANDING):  atovaquone  Suspension 1500 milliGRAM(s) Oral daily  buPROPion XL (24-Hour) . 150 milliGRAM(s) Oral daily  chlorhexidine 4% Liquid 1 Application(s) Topical <User Schedule>  dexAMETHasone  Injectable 6 milliGRAM(s) IV Push two times a day  DULoxetine 120 milliGRAM(s) Oral daily  enoxaparin Injectable 40 milliGRAM(s) SubCutaneous two times a day  lactulose Syrup 20 Gram(s) Oral two times a day  midodrine 5 milliGRAM(s) Oral every 8 hours  pantoprazole    Tablet 40 milliGRAM(s) Oral before breakfast  polyethylene glycol 3350 17 Gram(s) Oral daily  senna 2 Tablet(s) Oral at bedtime  traZODone 50 milliGRAM(s) Oral at bedtime    MEDICATIONS  (PRN):                                              ------------------------------------------------------------  VITAL SIGNS: Last 24 Hours  ICU Vital Signs Last 24 Hrs  T(C): 36.5 (28 Aug 2021 08:00), Max: 37 (27 Aug 2021 17:00)  T(F): 97.7 (28 Aug 2021 08:00), Max: 98.6 (27 Aug 2021 17:00)  HR: 92 (28 Aug 2021 11:00) (64 - 111)  BP: 93/64 (28 Aug 2021 11:00) (76/46 - 111/67)  BP(mean): 74 (28 Aug 2021 11:00) (51 - 90)  ABP: --  ABP(mean): --  RR: 29 (28 Aug 2021 11:00) (17 - 40)  SpO2: 98% (28 Aug 2021 11:00) (92% - 100%)                                             --------------------------------------------------------------  LABS:    cret                        13.5   13.80 )-----------( 439      ( 28 Aug 2021 05:03 )             41.7     08-28    135  |  95<L>  |  31<H>  ----------------------------<  82  4.4   |  29  |  0.7    Ca    9.5      28 Aug 2021 05:03    TPro  5.5<L>  /  Alb  3.3<L>  /  TBili  0.4  /  DBili  x   /  AST  30  /  ALT  37  /  AlkPhos  54  08-28                                                  -------------------------------------------------------------  RADIOLOGY:  < from: Xray Chest 1 View- PORTABLE-Routine (Xray Chest 1 View- PORTABLE-Routine in AM.) (08.26.21 @ 06:25) >  Diffuse bilateral opacities, unchanged.    < end of copied text >  < from: Xray Chest 1 View- PORTABLE-Routine (Xray Chest 1 View- PORTABLE-Routine in AM.) (08.24.21 @ 05:28) >  Unchanged bilateral opacities.    < end of copied text >      < from: VA Duplex Lower Ext Vein Scan, Bilat (08.23.21 @ 11:37) >  No evidence of deep venous thrombosis or superficial thrombophlebitis in the bilateral lower extremities.    < end of copied text >  < from: Xray Chest 1 View- PORTABLE-Routine (Xray Chest 1 View- PORTABLE-Routine in AM.) (08.24.21 @ 05:28) >  Unchanged bilateral opacities.    < end of copied text >      < from: Xray Chest 1 View- PORTABLE-Urgent (08.22.21 @ 15:25) >  INTERPRETATION:  Clinical History / Reason for exam: Shortness of breath and fever  Impression:  Diffuse bilateral opacities.                                            --------------------------------------------------------------    PHYSICAL EXAM:  GENERAL: NAD, lying in bed comfortably  HEAD:  Atraumatic, Normocephalic  EYES: EOMI, PERRLA, conjunctiva and sclera clear  NECK: Supple, No JVD  CHEST/LUNG: Decreased breath sounds bilaterally, mildly labored respirations  HEART: Regular rate and rhythm; No murmurs, rubs, or gallops  ABDOMEN:  Soft, Nontender, Nondistended  EXTREMITIES:  2+ Peripheral Pulses, brisk capillary refill. No clubbing, cyanosis, or edema  NERVOUS SYSTEM:  Alert & Oriented X3, speech clear. Facial deficit from Bell's Palsy  MSK: FROM all 4 extremities, full and equal strength  SKIN: No rashes or lesions                                         --------------------------------------------------------------    ASSESSMENT & PLAN    Past medical history and hospital course     56 yo F with PMHx of Bell's Palsy who tested + for COVID on 8/14 (unvaccianted) presented to ED 8/22/21 after having worsening shortness of breath and cough. Patient was admitted to MICU, now on high flow NC, CXR shows diffuse bilateral opacities. S/p 1 dose of toci and now on decadron 6q12. PCP prophylaxis for chronic steroid use. DC'd Lasix, patient became hypotensive, started midodrine.     # Acute hypoxic respiratory failure  # Severe COVID PNA  - S/p 1 dose tocilizumab 8/23/21  - Continue Decadron 6gIV q12  - Wean O2 as tolerated, 8/26/21 high flow decreased from 100-->80  - ID evaluation appreciated   - F/U TTE  - LE doppler: no DVT  - Inflammatory markers ferritin, procalcitonin, CRP, d-dimer, fungitell, BNP q48  - Repeat Chest XR every other AM (8/26/21 unchanged bilateral opacities)   - Educate patient to prone as much as possible (more encouragement given 8/26)  - DC Lasix, started midodrine for hypotension     # Metabolic acidosis 2/2 Lactic acidosis  - Trend lactate  - Correct electrolytes as needed     # H/O Bell's Palsy  - Stable  - Steroid use since 7/26/21, PCP prophylaxis atovaquone 1500 ml     # Major Depressive Disorder  - Continue home meds                                                                              ----------------------------------------------------  # DVT prophylaxis: Lovenox  # GI prophylaxis: Protonix  # Diet: Regular  # Code status: Full code  # Disposition: DANUTA                                                                           --------------------------------------------------------    # Handoff   Down grade to step down when oxygen needs decrease

## 2021-08-28 NOTE — PHARMACOTHERAPY INTERVENTION NOTE - COMMENTS
Dr. Harp came over to ICU pharmacy and explained that pt was told that they have a sulfa allergy during childhood. Dr. Gardner recommeds course of furosemide for now as per Dr. Harp. Will stop if allergy is serious
K 5.3-recommended 10g x1 then 5g q12h x2
SMX/TMP DS 1 tab daily, prophylaxis  -pt has sulfa allergy listed, d/w team, will change to atovaquone 1500mg daily
on midodrine 5mg q8h prn MAP<60, BP 81/46, d/w team, will change 5mg q8h ATC
plan-increase dexamethasone 6mg IV q12h, ordered as po, d/w team, will change to IVP

## 2021-08-29 LAB
ALBUMIN SERPL ELPH-MCNC: 3.2 G/DL — LOW (ref 3.5–5.2)
ALP SERPL-CCNC: 52 U/L — SIGNIFICANT CHANGE UP (ref 30–115)
ALT FLD-CCNC: 47 U/L — HIGH (ref 0–41)
ANION GAP SERPL CALC-SCNC: 8 MMOL/L — SIGNIFICANT CHANGE UP (ref 7–14)
AST SERPL-CCNC: 23 U/L — SIGNIFICANT CHANGE UP (ref 0–41)
BASOPHILS # BLD AUTO: 0.04 K/UL — SIGNIFICANT CHANGE UP (ref 0–0.2)
BASOPHILS NFR BLD AUTO: 0.3 % — SIGNIFICANT CHANGE UP (ref 0–1)
BILIRUB SERPL-MCNC: 0.3 MG/DL — SIGNIFICANT CHANGE UP (ref 0.2–1.2)
BUN SERPL-MCNC: 25 MG/DL — HIGH (ref 10–20)
CALCIUM SERPL-MCNC: 9.2 MG/DL — SIGNIFICANT CHANGE UP (ref 8.5–10.1)
CHLORIDE SERPL-SCNC: 98 MMOL/L — SIGNIFICANT CHANGE UP (ref 98–110)
CO2 SERPL-SCNC: 28 MMOL/L — SIGNIFICANT CHANGE UP (ref 17–32)
CREAT SERPL-MCNC: 0.6 MG/DL — LOW (ref 0.7–1.5)
D DIMER BLD IA.RAPID-MCNC: 288 NG/ML DDU — HIGH (ref 0–230)
EOSINOPHIL # BLD AUTO: 0.04 K/UL — SIGNIFICANT CHANGE UP (ref 0–0.7)
EOSINOPHIL NFR BLD AUTO: 0.3 % — SIGNIFICANT CHANGE UP (ref 0–8)
GLUCOSE SERPL-MCNC: 97 MG/DL — SIGNIFICANT CHANGE UP (ref 70–99)
HCT VFR BLD CALC: 39.3 % — SIGNIFICANT CHANGE UP (ref 37–47)
HGB BLD-MCNC: 12.7 G/DL — SIGNIFICANT CHANGE UP (ref 12–16)
IMM GRANULOCYTES NFR BLD AUTO: 4.1 % — HIGH (ref 0.1–0.3)
LYMPHOCYTES # BLD AUTO: 0.88 K/UL — LOW (ref 1.2–3.4)
LYMPHOCYTES # BLD AUTO: 7 % — LOW (ref 20.5–51.1)
MCHC RBC-ENTMCNC: 27.5 PG — SIGNIFICANT CHANGE UP (ref 27–31)
MCHC RBC-ENTMCNC: 32.3 G/DL — SIGNIFICANT CHANGE UP (ref 32–37)
MCV RBC AUTO: 85.2 FL — SIGNIFICANT CHANGE UP (ref 81–99)
MONOCYTES # BLD AUTO: 0.72 K/UL — HIGH (ref 0.1–0.6)
MONOCYTES NFR BLD AUTO: 5.7 % — SIGNIFICANT CHANGE UP (ref 1.7–9.3)
NEUTROPHILS # BLD AUTO: 10.4 K/UL — HIGH (ref 1.4–6.5)
NEUTROPHILS NFR BLD AUTO: 82.6 % — HIGH (ref 42.2–75.2)
NRBC # BLD: 0 /100 WBCS — SIGNIFICANT CHANGE UP (ref 0–0)
NT-PROBNP SERPL-SCNC: 73 PG/ML — SIGNIFICANT CHANGE UP (ref 0–300)
PLATELET # BLD AUTO: 410 K/UL — HIGH (ref 130–400)
POTASSIUM SERPL-MCNC: 5 MMOL/L — SIGNIFICANT CHANGE UP (ref 3.5–5)
POTASSIUM SERPL-SCNC: 5 MMOL/L — SIGNIFICANT CHANGE UP (ref 3.5–5)
PROT SERPL-MCNC: 5.3 G/DL — LOW (ref 6–8)
RBC # BLD: 4.61 M/UL — SIGNIFICANT CHANGE UP (ref 4.2–5.4)
RBC # FLD: 14.2 % — SIGNIFICANT CHANGE UP (ref 11.5–14.5)
SODIUM SERPL-SCNC: 134 MMOL/L — LOW (ref 135–146)
WBC # BLD: 12.59 K/UL — HIGH (ref 4.8–10.8)
WBC # FLD AUTO: 12.59 K/UL — HIGH (ref 4.8–10.8)

## 2021-08-29 PROCEDURE — 71045 X-RAY EXAM CHEST 1 VIEW: CPT | Mod: 26

## 2021-08-29 PROCEDURE — 99233 SBSQ HOSP IP/OBS HIGH 50: CPT

## 2021-08-29 RX ADMIN — Medication 6 MILLIGRAM(S): at 18:01

## 2021-08-29 RX ADMIN — MIDODRINE HYDROCHLORIDE 5 MILLIGRAM(S): 2.5 TABLET ORAL at 00:48

## 2021-08-29 RX ADMIN — MIDODRINE HYDROCHLORIDE 5 MILLIGRAM(S): 2.5 TABLET ORAL at 06:20

## 2021-08-29 RX ADMIN — Medication 50 MILLIGRAM(S): at 22:02

## 2021-08-29 RX ADMIN — LACTULOSE 20 GRAM(S): 10 SOLUTION ORAL at 18:01

## 2021-08-29 RX ADMIN — BUPROPION HYDROCHLORIDE 150 MILLIGRAM(S): 150 TABLET, EXTENDED RELEASE ORAL at 12:18

## 2021-08-29 RX ADMIN — Medication 6 MILLIGRAM(S): at 06:19

## 2021-08-29 RX ADMIN — DULOXETINE HYDROCHLORIDE 120 MILLIGRAM(S): 30 CAPSULE, DELAYED RELEASE ORAL at 12:18

## 2021-08-29 RX ADMIN — PANTOPRAZOLE SODIUM 40 MILLIGRAM(S): 20 TABLET, DELAYED RELEASE ORAL at 06:19

## 2021-08-29 RX ADMIN — ATOVAQUONE 1500 MILLIGRAM(S): 750 SUSPENSION ORAL at 12:18

## 2021-08-29 RX ADMIN — MIDODRINE HYDROCHLORIDE 5 MILLIGRAM(S): 2.5 TABLET ORAL at 15:23

## 2021-08-29 RX ADMIN — LACTULOSE 20 GRAM(S): 10 SOLUTION ORAL at 06:20

## 2021-08-29 RX ADMIN — ENOXAPARIN SODIUM 40 MILLIGRAM(S): 100 INJECTION SUBCUTANEOUS at 06:19

## 2021-08-29 RX ADMIN — SENNA PLUS 2 TABLET(S): 8.6 TABLET ORAL at 22:02

## 2021-08-29 RX ADMIN — CHLORHEXIDINE GLUCONATE 1 APPLICATION(S): 213 SOLUTION TOPICAL at 06:21

## 2021-08-29 RX ADMIN — POLYETHYLENE GLYCOL 3350 17 GRAM(S): 17 POWDER, FOR SOLUTION ORAL at 12:18

## 2021-08-29 RX ADMIN — ENOXAPARIN SODIUM 40 MILLIGRAM(S): 100 INJECTION SUBCUTANEOUS at 18:01

## 2021-08-29 NOTE — PROGRESS NOTE ADULT - ASSESSMENT
Assessment/Plan:  1-acute hypoxemic respiretory failure  -high flow O2  2-COVID PNA  -continue dexamethasone  3-GI/DVT prophylaxis

## 2021-08-29 NOTE — PROGRESS NOTE ADULT - SUBJECTIVE AND OBJECTIVE BOX
Over Night Events:        ROS:  See HPI    PHYSICAL EXAM    ICU Vital Signs Last 24 Hrs  T(C): 36.3 (29 Aug 2021 08:00), Max: 36.4 (28 Aug 2021 12:00)  T(F): 97.3 (29 Aug 2021 08:00), Max: 97.5 (28 Aug 2021 12:00)  HR: 88 (29 Aug 2021 10:00) (56 - 92)  BP: 97/46 (29 Aug 2021 10:00) (81/43 - 127/58)  BP(mean): 66 (29 Aug 2021 10:00) (56 - 91)  ABP: --  ABP(mean): --  RR: 26 (29 Aug 2021 10:00) (11 - 36)  SpO2: 96% (29 Aug 2021 10:00) (91% - 98%)      General:  HEENT:                Lymph Nodes: NO cervical LN   Lungs: Bilateral BS  Cardiovascular: Regular   Abdomen: Soft, Positive BS  Extremities: No clubbing   Skin:   Neurological:       LABS:                          12.7   12.59 )-----------( 410      ( 29 Aug 2021 05:13 )             39.3                                               08-29    134<L>  |  98  |  25<H>  ----------------------------<  97  5.0   |  28  |  0.6<L>    Ca    9.2      29 Aug 2021 05:13    TPro  5.3<L>  /  Alb  3.2<L>  /  TBili  0.3  /  DBili  x   /  AST  23  /  ALT  47<H>  /  AlkPhos  52  08-29                                                                                           LIVER FUNCTIONS - ( 29 Aug 2021 05:13 )  Alb: 3.2 g/dL / Pro: 5.3 g/dL / ALK PHOS: 52 U/L / ALT: 47 U/L / AST: 23 U/L / GGT: x                                                                                                                                       MEDICATIONS  (STANDING):  atovaquone  Suspension 1500 milliGRAM(s) Oral daily  buPROPion XL (24-Hour) . 150 milliGRAM(s) Oral daily  chlorhexidine 4% Liquid 1 Application(s) Topical <User Schedule>  dexAMETHasone  Injectable 6 milliGRAM(s) IV Push two times a day  DULoxetine 120 milliGRAM(s) Oral daily  enoxaparin Injectable 40 milliGRAM(s) SubCutaneous two times a day  lactulose Syrup 20 Gram(s) Oral two times a day  midodrine 5 milliGRAM(s) Oral every 8 hours  pantoprazole    Tablet 40 milliGRAM(s) Oral before breakfast  polyethylene glycol 3350 17 Gram(s) Oral daily  senna 2 Tablet(s) Oral at bedtime  traZODone 50 milliGRAM(s) Oral at bedtime    MEDICATIONS  (PRN):      Xrays:   bilateral opacities

## 2021-08-30 LAB
ALBUMIN SERPL ELPH-MCNC: 3.3 G/DL — LOW (ref 3.5–5.2)
ALBUMIN SERPL ELPH-MCNC: 3.6 G/DL — SIGNIFICANT CHANGE UP (ref 3.5–5.2)
ALP SERPL-CCNC: 53 U/L — SIGNIFICANT CHANGE UP (ref 30–115)
ALP SERPL-CCNC: 58 U/L — SIGNIFICANT CHANGE UP (ref 30–115)
ALT FLD-CCNC: 41 U/L — SIGNIFICANT CHANGE UP (ref 0–41)
ALT FLD-CCNC: 46 U/L — HIGH (ref 0–41)
ANION GAP SERPL CALC-SCNC: 10 MMOL/L — SIGNIFICANT CHANGE UP (ref 7–14)
ANION GAP SERPL CALC-SCNC: 9 MMOL/L — SIGNIFICANT CHANGE UP (ref 7–14)
AST SERPL-CCNC: 17 U/L — SIGNIFICANT CHANGE UP (ref 0–41)
AST SERPL-CCNC: 24 U/L — SIGNIFICANT CHANGE UP (ref 0–41)
BASOPHILS # BLD AUTO: 0.03 K/UL — SIGNIFICANT CHANGE UP (ref 0–0.2)
BASOPHILS # BLD AUTO: 0.04 K/UL — SIGNIFICANT CHANGE UP (ref 0–0.2)
BASOPHILS NFR BLD AUTO: 0.2 % — SIGNIFICANT CHANGE UP (ref 0–1)
BASOPHILS NFR BLD AUTO: 0.3 % — SIGNIFICANT CHANGE UP (ref 0–1)
BILIRUB SERPL-MCNC: 0.3 MG/DL — SIGNIFICANT CHANGE UP (ref 0.2–1.2)
BILIRUB SERPL-MCNC: 0.3 MG/DL — SIGNIFICANT CHANGE UP (ref 0.2–1.2)
BUN SERPL-MCNC: 25 MG/DL — HIGH (ref 10–20)
BUN SERPL-MCNC: 27 MG/DL — HIGH (ref 10–20)
CALCIUM SERPL-MCNC: 9.2 MG/DL — SIGNIFICANT CHANGE UP (ref 8.5–10.1)
CALCIUM SERPL-MCNC: 9.4 MG/DL — SIGNIFICANT CHANGE UP (ref 8.5–10.1)
CHLORIDE SERPL-SCNC: 100 MMOL/L — SIGNIFICANT CHANGE UP (ref 98–110)
CHLORIDE SERPL-SCNC: 98 MMOL/L — SIGNIFICANT CHANGE UP (ref 98–110)
CO2 SERPL-SCNC: 27 MMOL/L — SIGNIFICANT CHANGE UP (ref 17–32)
CO2 SERPL-SCNC: 28 MMOL/L — SIGNIFICANT CHANGE UP (ref 17–32)
CREAT SERPL-MCNC: 0.6 MG/DL — LOW (ref 0.7–1.5)
CREAT SERPL-MCNC: 0.6 MG/DL — LOW (ref 0.7–1.5)
CRP SERPL-MCNC: 5 MG/L — HIGH
EOSINOPHIL # BLD AUTO: 0 K/UL — SIGNIFICANT CHANGE UP (ref 0–0.7)
EOSINOPHIL # BLD AUTO: 0.01 K/UL — SIGNIFICANT CHANGE UP (ref 0–0.7)
EOSINOPHIL NFR BLD AUTO: 0 % — SIGNIFICANT CHANGE UP (ref 0–8)
EOSINOPHIL NFR BLD AUTO: 0.1 % — SIGNIFICANT CHANGE UP (ref 0–8)
GLUCOSE SERPL-MCNC: 156 MG/DL — HIGH (ref 70–99)
GLUCOSE SERPL-MCNC: 90 MG/DL — SIGNIFICANT CHANGE UP (ref 70–99)
HCT VFR BLD CALC: 39.5 % — SIGNIFICANT CHANGE UP (ref 37–47)
HCT VFR BLD CALC: 40.3 % — SIGNIFICANT CHANGE UP (ref 37–47)
HGB BLD-MCNC: 12.8 G/DL — SIGNIFICANT CHANGE UP (ref 12–16)
HGB BLD-MCNC: 13.2 G/DL — SIGNIFICANT CHANGE UP (ref 12–16)
IMM GRANULOCYTES NFR BLD AUTO: 2.9 % — HIGH (ref 0.1–0.3)
IMM GRANULOCYTES NFR BLD AUTO: 3.1 % — HIGH (ref 0.1–0.3)
LYMPHOCYTES # BLD AUTO: 0.62 K/UL — LOW (ref 1.2–3.4)
LYMPHOCYTES # BLD AUTO: 1.03 K/UL — LOW (ref 1.2–3.4)
LYMPHOCYTES # BLD AUTO: 4.7 % — LOW (ref 20.5–51.1)
LYMPHOCYTES # BLD AUTO: 7.2 % — LOW (ref 20.5–51.1)
MCHC RBC-ENTMCNC: 27.6 PG — SIGNIFICANT CHANGE UP (ref 27–31)
MCHC RBC-ENTMCNC: 27.6 PG — SIGNIFICANT CHANGE UP (ref 27–31)
MCHC RBC-ENTMCNC: 32.4 G/DL — SIGNIFICANT CHANGE UP (ref 32–37)
MCHC RBC-ENTMCNC: 32.8 G/DL — SIGNIFICANT CHANGE UP (ref 32–37)
MCV RBC AUTO: 84.3 FL — SIGNIFICANT CHANGE UP (ref 81–99)
MCV RBC AUTO: 85.1 FL — SIGNIFICANT CHANGE UP (ref 81–99)
MONOCYTES # BLD AUTO: 0.63 K/UL — HIGH (ref 0.1–0.6)
MONOCYTES # BLD AUTO: 0.8 K/UL — HIGH (ref 0.1–0.6)
MONOCYTES NFR BLD AUTO: 4.7 % — SIGNIFICANT CHANGE UP (ref 1.7–9.3)
MONOCYTES NFR BLD AUTO: 5.6 % — SIGNIFICANT CHANGE UP (ref 1.7–9.3)
NEUTROPHILS # BLD AUTO: 11.63 K/UL — HIGH (ref 1.4–6.5)
NEUTROPHILS # BLD AUTO: 11.98 K/UL — HIGH (ref 1.4–6.5)
NEUTROPHILS NFR BLD AUTO: 83.7 % — HIGH (ref 42.2–75.2)
NEUTROPHILS NFR BLD AUTO: 87.5 % — HIGH (ref 42.2–75.2)
NRBC # BLD: 0 /100 WBCS — SIGNIFICANT CHANGE UP (ref 0–0)
NRBC # BLD: 0 /100 WBCS — SIGNIFICANT CHANGE UP (ref 0–0)
PLATELET # BLD AUTO: 374 K/UL — SIGNIFICANT CHANGE UP (ref 130–400)
PLATELET # BLD AUTO: 409 K/UL — HIGH (ref 130–400)
POTASSIUM SERPL-MCNC: 4.5 MMOL/L — SIGNIFICANT CHANGE UP (ref 3.5–5)
POTASSIUM SERPL-MCNC: 4.9 MMOL/L — SIGNIFICANT CHANGE UP (ref 3.5–5)
POTASSIUM SERPL-SCNC: 4.5 MMOL/L — SIGNIFICANT CHANGE UP (ref 3.5–5)
POTASSIUM SERPL-SCNC: 4.9 MMOL/L — SIGNIFICANT CHANGE UP (ref 3.5–5)
PROCALCITONIN SERPL-MCNC: 0.03 NG/ML — SIGNIFICANT CHANGE UP (ref 0.02–0.1)
PROT SERPL-MCNC: 5.3 G/DL — LOW (ref 6–8)
PROT SERPL-MCNC: 5.6 G/DL — LOW (ref 6–8)
RBC # BLD: 4.64 M/UL — SIGNIFICANT CHANGE UP (ref 4.2–5.4)
RBC # BLD: 4.78 M/UL — SIGNIFICANT CHANGE UP (ref 4.2–5.4)
RBC # FLD: 14.3 % — SIGNIFICANT CHANGE UP (ref 11.5–14.5)
RBC # FLD: 14.4 % — SIGNIFICANT CHANGE UP (ref 11.5–14.5)
SODIUM SERPL-SCNC: 136 MMOL/L — SIGNIFICANT CHANGE UP (ref 135–146)
SODIUM SERPL-SCNC: 136 MMOL/L — SIGNIFICANT CHANGE UP (ref 135–146)
WBC # BLD: 13.29 K/UL — HIGH (ref 4.8–10.8)
WBC # BLD: 14.3 K/UL — HIGH (ref 4.8–10.8)
WBC # FLD AUTO: 13.29 K/UL — HIGH (ref 4.8–10.8)
WBC # FLD AUTO: 14.3 K/UL — HIGH (ref 4.8–10.8)

## 2021-08-30 PROCEDURE — 93010 ELECTROCARDIOGRAM REPORT: CPT

## 2021-08-30 PROCEDURE — 71045 X-RAY EXAM CHEST 1 VIEW: CPT | Mod: 26

## 2021-08-30 PROCEDURE — 99233 SBSQ HOSP IP/OBS HIGH 50: CPT

## 2021-08-30 RX ORDER — DEXAMETHASONE 0.5 MG/5ML
6 ELIXIR ORAL DAILY
Refills: 0 | Status: DISCONTINUED | OUTPATIENT
Start: 2021-08-31 | End: 2021-09-03

## 2021-08-30 RX ADMIN — Medication 50 MILLIGRAM(S): at 21:24

## 2021-08-30 RX ADMIN — CHLORHEXIDINE GLUCONATE 1 APPLICATION(S): 213 SOLUTION TOPICAL at 05:47

## 2021-08-30 RX ADMIN — SENNA PLUS 2 TABLET(S): 8.6 TABLET ORAL at 21:24

## 2021-08-30 RX ADMIN — ATOVAQUONE 1500 MILLIGRAM(S): 750 SUSPENSION ORAL at 12:51

## 2021-08-30 RX ADMIN — BUPROPION HYDROCHLORIDE 150 MILLIGRAM(S): 150 TABLET, EXTENDED RELEASE ORAL at 12:51

## 2021-08-30 RX ADMIN — LACTULOSE 20 GRAM(S): 10 SOLUTION ORAL at 05:47

## 2021-08-30 RX ADMIN — DULOXETINE HYDROCHLORIDE 120 MILLIGRAM(S): 30 CAPSULE, DELAYED RELEASE ORAL at 12:51

## 2021-08-30 RX ADMIN — POLYETHYLENE GLYCOL 3350 17 GRAM(S): 17 POWDER, FOR SOLUTION ORAL at 12:52

## 2021-08-30 RX ADMIN — MIDODRINE HYDROCHLORIDE 5 MILLIGRAM(S): 2.5 TABLET ORAL at 13:05

## 2021-08-30 RX ADMIN — ENOXAPARIN SODIUM 40 MILLIGRAM(S): 100 INJECTION SUBCUTANEOUS at 18:04

## 2021-08-30 RX ADMIN — PANTOPRAZOLE SODIUM 40 MILLIGRAM(S): 20 TABLET, DELAYED RELEASE ORAL at 06:01

## 2021-08-30 RX ADMIN — Medication 6 MILLIGRAM(S): at 05:48

## 2021-08-30 RX ADMIN — ENOXAPARIN SODIUM 40 MILLIGRAM(S): 100 INJECTION SUBCUTANEOUS at 05:47

## 2021-08-30 NOTE — PHYSICAL THERAPY INITIAL EVALUATION ADULT - PERTINENT HX OF CURRENT PROBLEM, REHAB EVAL
56 yo F with PMHx of Bell's Palsy who tested + for COVID on 8/14 (unvaccianted) presented to ED 8/22/21 after having worsening shortness of breath and cough. Patient was admitted to MICU, now on high flow NC, CXR shows diffuse bilateral opacities. S/p 1 dose of toci and now on decadron 6q12. PCP prophylaxis for chronic steroid use. DC'd Lasix, patient became hypotensive, started midodrine.

## 2021-08-30 NOTE — PHYSICAL THERAPY INITIAL EVALUATION ADULT - GENERAL OBSERVATIONS, REHAB EVAL
13:45-14:15. chart reviewed. Pt received semi-tan at B/S, alert, oriented, able to follow multi-step instructions and agreeable to PT evaluation. + monitoring, + primafit, + sequentials, + Hi flow O2 40/50, initial SPO2 94%, desaturate after activity to 80% took 3-4 min. with deep breathing exercises to reach 90%

## 2021-08-30 NOTE — CHART NOTE - NSCHARTNOTEFT_GEN_A_CORE
MICU Transfer Note    Transfer from: ICU  Transfer to:  3E bed 15  Accepting physican:    MEDICATIONS:  STANDING MEDICATIONS  atovaquone  Suspension 1500 milliGRAM(s) Oral daily  buPROPion XL (24-Hour) . 150 milliGRAM(s) Oral daily  chlorhexidine 4% Liquid 1 Application(s) Topical <User Schedule>  DULoxetine 120 milliGRAM(s) Oral daily  enoxaparin Injectable 40 milliGRAM(s) SubCutaneous two times a day  lactulose Syrup 20 Gram(s) Oral two times a day  midodrine 5 milliGRAM(s) Oral every 8 hours  pantoprazole    Tablet 40 milliGRAM(s) Oral before breakfast  polyethylene glycol 3350 17 Gram(s) Oral daily  senna 2 Tablet(s) Oral at bedtime  traZODone 50 milliGRAM(s) Oral at bedtime    PRN MEDICATIONS      VITAL SIGNS: Last 24 Hours  T(C): 35.4 (30 Aug 2021 12:00), Max: 36.8 (29 Aug 2021 20:00)  T(F): 95.8 (30 Aug 2021 12:00), Max: 98.3 (29 Aug 2021 20:00)  HR: 100 (30 Aug 2021 14:00) (64 - 100)  BP: 118/70 (30 Aug 2021 13:00) (101/56 - 133/57)  BP(mean): 80 (30 Aug 2021 13:00) (65 - 92)  RR: 52 (30 Aug 2021 14:00) (17 - 52)  SpO2: 91% (30 Aug 2021 14:07) (89% - 98%)    LABS:                        13.2   13.29 )-----------( 374      ( 30 Aug 2021 12:09 )             40.3     08-30    136  |  100  |  25<H>  ----------------------------<  156<H>  4.5   |  27  |  0.6<L>    Ca    9.4      30 Aug 2021 12:09    TPro  5.6<L>  /  Alb  3.6  /  TBili  0.3  /  DBili  x   /  AST  24  /  ALT  46<H>  /  AlkPhos  58  08-30                RADIOLOGY:  < from: Xray Chest 1 View- PORTABLE-Routine (Xray Chest 1 View- PORTABLE-Routine in AM.) (08.30.21 @ 06:44) >    Findings:    Support devices: None.    Cardiac/mediastinum/hilum: Unchanged    Lung parenchyma/Pleura: Bilateral pulmonary opacities similar to prior. No pneumothorax.    Skeleton/soft tissues: Unchanged    Impression:    Bilateral pulmonaryopacities similar to prior          CCU COURSE:  56 yo F who tested + for COVID on 8/14 (unvaccianted) presented to ED 8/22/21 after having worsening shortness of breath and cough. Patient was admitted to MICU, now on high flow NC, CXR shows diffuse bilateral opacities. S/p 1 dose of toci, started decadron 6q12 on 8/23/21. Patient has been on steroids since 7/26 for [place] Palsy, therefore we started atovaquone 1500 ml per day for PCP prophylaxis. Patient stable in MICU, high flow decreased since 8/28, inflammatory markers all trending down. Lasix made patient hypotensive, so we DC'd, gave two boluses and started Midodrine.  Patient has been educated multiple times on the need to prone herself, continues to need reminding. Dexa was decreased to qD 8/30          ASSESSMENT & PLAN:       # Acute hypoxic respiratory failure  # Severe COVID PNA  - S/p 1 dose tocilizumab 8/23/21  - Continue Decadron 6gIV q12  - Wean O2 as tolerated, 8/26/21 high flow decreased from 100-->80  - ID evaluation appreciated   - F/U TTE  - LE doppler: no DVT  - Inflammatory markers ferritin, procalcitonin, CRP, d-dimer, fungitell, BNP q48  - Repeat Chest XR every other AM (8/26/21 unchanged bilateral opacities)   - Educate patient to prone as much as possible (more encouragement given 8/26)  - DC Lasix, started midodrine for hypotension     # Metabolic acidosis 2/2 Lactic acidosis  - Trend lactate  - Correct electrolytes as needed     # H/O Bell's Palsy  - Stable  - Steroid use since 7/26/21, PCP prophylaxis atovaquone 1500 ml     # Major Depressive Disorder  - Continue home meds                                                                              ----------------------------------------------------  # DVT prophylaxis: Lovenox  # GI prophylaxis: Protonix  # Diet: Regular  # Code status: Full code  # Disposition: MICU downgrade     For Follow-Up:  Procalcitonin  D-Dimer  CRP  BNP  Fungitel   Ferratin  CBC +diff  CMP  Labs are in

## 2021-08-30 NOTE — PHYSICAL THERAPY INITIAL EVALUATION ADULT - GAIT TRAINING, PT EVAL
Pt will ambulate using RW or least restrictive AD for 100 ft with S/U by discharge to facilitate return to PLOF.

## 2021-08-30 NOTE — PROGRESS NOTE ADULT - ASSESSMENT
IMPRESSION:    Acute Hypoxic Respiratory Failure on HHFNC 60/80  Severe COVID pneumonia SP TOCI   Hyponatremia improved  HO [place] Palsy    PLAN:    CNS:  Avoid CNS depressants.      HEENT: Oral care    PULMONARY:  HOB @45 degrees.  HFNC.  Wean O2 as tolerated.  Dexa 6mg IV q 24 D#9 ; incentive spirometry    CARDIOVASCULAR: AVoid volume overload     GI: GI prophylaxis. Diet as tolerated    RENAL:  Follow up lytes.  Correct as needed.      INFECTIOUS DISEASE: Follow up cultures. trend markers    HEMATOLOGICAL:  DVT prophylaxis.  LMWH     ENDOCRINE:  Follow up FS.      MUSCULOSKELETAL:  OOB to simair     ED3

## 2021-08-30 NOTE — PROGRESS NOTE ADULT - SUBJECTIVE AND OBJECTIVE BOX
Patient is a 55y old  Female who presents with a chief complaint of SOB/Cough (29 Aug 2021 11:05)        Over Night Events:  Remains Critically ill on HFNCO2 40 liters 50%.  Off pressors.          ROS:     All ROS are negative except HPI         PHYSICAL EXAM    ICU Vital Signs Last 24 Hrs  T(C): 36 (30 Aug 2021 08:00), Max: 36.8 (29 Aug 2021 20:00)  T(F): 96.8 (30 Aug 2021 08:00), Max: 98.3 (29 Aug 2021 20:00)  HR: 72 (30 Aug 2021 08:00) (64 - 96)  BP: 113/52 (30 Aug 2021 08:00) (101/56 - 133/57)  BP(mean): 73 (30 Aug 2021 08:00) (65 - 92)  ABP: --  ABP(mean): --  RR: 19 (30 Aug 2021 08:00) (17 - 32)  SpO2: 93% (30 Aug 2021 08:58) (91% - 98%)      CONSTITUTIONAL:  Well nourished.  NAD    ENT:   Airway patent,   Mouth with normal mucosa.   No thrush    EYES:   Pupils equal,   Round and reactive to light.    CARDIAC:   Normal rate,   Regular rhythm.    No edema      Vascular:  Normal systolic impulse  No Carotid bruits    RESPIRATORY:   No wheezing  Bilateral crackles   Normal chest expansion  Not tachypneic,  No use of accessory muscles    GASTROINTESTINAL:  Abdomen soft,   Non-tender,   No guarding,   + BS    MUSCULOSKELETAL:   Range of motion is not limited,  No clubbing, cyanosis    NEUROLOGICAL:   Alert and oriented   No motor  deficits.    SKIN:   Skin normal color for race,   Warm and dry  No evidence of rash.    PSYCHIATRIC:   Normal mood and affect.   No apparent risk to self or others.    HEMATOLOGICAL:  No cervical  lymphadenopathy.  no inguinal lymphadenopathy      08-29-21 @ 07:01  -  08-30-21 @ 07:00  --------------------------------------------------------  IN:    Oral Fluid: 480 mL  Total IN: 480 mL    OUT:    Voided (mL): 1900 mL  Total OUT: 1900 mL    Total NET: -1420 mL          LABS:                            12.8   14.30 )-----------( 409      ( 30 Aug 2021 04:56 )             39.5                                               08-30    136  |  98  |  27<H>  ----------------------------<  90  4.9   |  28  |  0.6<L>    Ca    9.2      30 Aug 2021 04:56    TPro  5.3<L>  /  Alb  3.3<L>  /  TBili  0.3  /  DBili  x   /  AST  17  /  ALT  41  /  AlkPhos  53  08-30                                                                                           LIVER FUNCTIONS - ( 30 Aug 2021 04:56 )  Alb: 3.3 g/dL / Pro: 5.3 g/dL / ALK PHOS: 53 U/L / ALT: 41 U/L / AST: 17 U/L / GGT: x                                                                                                                                       MEDICATIONS  (STANDING):  atovaquone  Suspension 1500 milliGRAM(s) Oral daily  buPROPion XL (24-Hour) . 150 milliGRAM(s) Oral daily  chlorhexidine 4% Liquid 1 Application(s) Topical <User Schedule>  dexAMETHasone  Injectable 6 milliGRAM(s) IV Push two times a day  DULoxetine 120 milliGRAM(s) Oral daily  enoxaparin Injectable 40 milliGRAM(s) SubCutaneous two times a day  lactulose Syrup 20 Gram(s) Oral two times a day  midodrine 5 milliGRAM(s) Oral every 8 hours  pantoprazole    Tablet 40 milliGRAM(s) Oral before breakfast  polyethylene glycol 3350 17 Gram(s) Oral daily  senna 2 Tablet(s) Oral at bedtime  traZODone 50 milliGRAM(s) Oral at bedtime    MEDICATIONS  (PRN):      New X-rays reviewed:                                                                                  ECHO    CXR interpreted by me:  Bilateral inifltrates

## 2021-08-31 LAB
ALBUMIN SERPL ELPH-MCNC: 3.4 G/DL — LOW (ref 3.5–5.2)
ALP SERPL-CCNC: 56 U/L — SIGNIFICANT CHANGE UP (ref 30–115)
ALT FLD-CCNC: 52 U/L — HIGH (ref 0–41)
ANION GAP SERPL CALC-SCNC: 10 MMOL/L — SIGNIFICANT CHANGE UP (ref 7–14)
AST SERPL-CCNC: 27 U/L — SIGNIFICANT CHANGE UP (ref 0–41)
BASOPHILS # BLD AUTO: 0.04 K/UL — SIGNIFICANT CHANGE UP (ref 0–0.2)
BASOPHILS NFR BLD AUTO: 0.4 % — SIGNIFICANT CHANGE UP (ref 0–1)
BILIRUB SERPL-MCNC: 0.4 MG/DL — SIGNIFICANT CHANGE UP (ref 0.2–1.2)
BUN SERPL-MCNC: 31 MG/DL — HIGH (ref 10–20)
CALCIUM SERPL-MCNC: 9.5 MG/DL — SIGNIFICANT CHANGE UP (ref 8.5–10.1)
CHLORIDE SERPL-SCNC: 97 MMOL/L — LOW (ref 98–110)
CO2 SERPL-SCNC: 28 MMOL/L — SIGNIFICANT CHANGE UP (ref 17–32)
CREAT SERPL-MCNC: 0.7 MG/DL — SIGNIFICANT CHANGE UP (ref 0.7–1.5)
D DIMER BLD IA.RAPID-MCNC: 278 NG/ML DDU — HIGH (ref 0–230)
EOSINOPHIL # BLD AUTO: 0.08 K/UL — SIGNIFICANT CHANGE UP (ref 0–0.7)
EOSINOPHIL NFR BLD AUTO: 0.9 % — SIGNIFICANT CHANGE UP (ref 0–8)
FUNGITELL: 112 PG/ML — HIGH
GLUCOSE SERPL-MCNC: 96 MG/DL — SIGNIFICANT CHANGE UP (ref 70–99)
HCT VFR BLD CALC: 41.8 % — SIGNIFICANT CHANGE UP (ref 37–47)
HGB BLD-MCNC: 13.4 G/DL — SIGNIFICANT CHANGE UP (ref 12–16)
IMM GRANULOCYTES NFR BLD AUTO: 5.3 % — HIGH (ref 0.1–0.3)
LYMPHOCYTES # BLD AUTO: 1.24 K/UL — SIGNIFICANT CHANGE UP (ref 1.2–3.4)
LYMPHOCYTES # BLD AUTO: 13.4 % — LOW (ref 20.5–51.1)
MCHC RBC-ENTMCNC: 27.4 PG — SIGNIFICANT CHANGE UP (ref 27–31)
MCHC RBC-ENTMCNC: 32.1 G/DL — SIGNIFICANT CHANGE UP (ref 32–37)
MCV RBC AUTO: 85.5 FL — SIGNIFICANT CHANGE UP (ref 81–99)
MONOCYTES # BLD AUTO: 0.59 K/UL — SIGNIFICANT CHANGE UP (ref 0.1–0.6)
MONOCYTES NFR BLD AUTO: 6.4 % — SIGNIFICANT CHANGE UP (ref 1.7–9.3)
NEUTROPHILS # BLD AUTO: 6.83 K/UL — HIGH (ref 1.4–6.5)
NEUTROPHILS NFR BLD AUTO: 73.6 % — SIGNIFICANT CHANGE UP (ref 42.2–75.2)
NRBC # BLD: 0 /100 WBCS — SIGNIFICANT CHANGE UP (ref 0–0)
NT-PROBNP SERPL-SCNC: 77 PG/ML — SIGNIFICANT CHANGE UP (ref 0–300)
PLATELET # BLD AUTO: 329 K/UL — SIGNIFICANT CHANGE UP (ref 130–400)
POTASSIUM SERPL-MCNC: 4.9 MMOL/L — SIGNIFICANT CHANGE UP (ref 3.5–5)
POTASSIUM SERPL-SCNC: 4.9 MMOL/L — SIGNIFICANT CHANGE UP (ref 3.5–5)
PROT SERPL-MCNC: 5.4 G/DL — LOW (ref 6–8)
RBC # BLD: 4.89 M/UL — SIGNIFICANT CHANGE UP (ref 4.2–5.4)
RBC # FLD: 14.7 % — HIGH (ref 11.5–14.5)
SODIUM SERPL-SCNC: 135 MMOL/L — SIGNIFICANT CHANGE UP (ref 135–146)
WBC # BLD: 9.27 K/UL — SIGNIFICANT CHANGE UP (ref 4.8–10.8)
WBC # FLD AUTO: 9.27 K/UL — SIGNIFICANT CHANGE UP (ref 4.8–10.8)

## 2021-08-31 PROCEDURE — 99232 SBSQ HOSP IP/OBS MODERATE 35: CPT

## 2021-08-31 PROCEDURE — 99233 SBSQ HOSP IP/OBS HIGH 50: CPT

## 2021-08-31 RX ORDER — SODIUM CHLORIDE 9 MG/ML
1000 INJECTION, SOLUTION INTRAVENOUS
Refills: 0 | Status: DISCONTINUED | OUTPATIENT
Start: 2021-08-31 | End: 2021-08-31

## 2021-08-31 RX ADMIN — CHLORHEXIDINE GLUCONATE 1 APPLICATION(S): 213 SOLUTION TOPICAL at 05:29

## 2021-08-31 RX ADMIN — POLYETHYLENE GLYCOL 3350 17 GRAM(S): 17 POWDER, FOR SOLUTION ORAL at 11:32

## 2021-08-31 RX ADMIN — ENOXAPARIN SODIUM 40 MILLIGRAM(S): 100 INJECTION SUBCUTANEOUS at 17:02

## 2021-08-31 RX ADMIN — SENNA PLUS 2 TABLET(S): 8.6 TABLET ORAL at 21:37

## 2021-08-31 RX ADMIN — DULOXETINE HYDROCHLORIDE 120 MILLIGRAM(S): 30 CAPSULE, DELAYED RELEASE ORAL at 11:15

## 2021-08-31 RX ADMIN — Medication 50 MILLIGRAM(S): at 21:37

## 2021-08-31 RX ADMIN — PANTOPRAZOLE SODIUM 40 MILLIGRAM(S): 20 TABLET, DELAYED RELEASE ORAL at 06:02

## 2021-08-31 RX ADMIN — BUPROPION HYDROCHLORIDE 150 MILLIGRAM(S): 150 TABLET, EXTENDED RELEASE ORAL at 11:21

## 2021-08-31 RX ADMIN — Medication 6 MILLIGRAM(S): at 05:30

## 2021-08-31 RX ADMIN — ENOXAPARIN SODIUM 40 MILLIGRAM(S): 100 INJECTION SUBCUTANEOUS at 05:30

## 2021-08-31 RX ADMIN — ATOVAQUONE 1500 MILLIGRAM(S): 750 SUSPENSION ORAL at 11:21

## 2021-08-31 NOTE — PROGRESS NOTE ADULT - SUBJECTIVE AND OBJECTIVE BOX
Patient is a 55y old  Female who presents with a chief complaint of SOB/Cough (30 Aug 2021 10:04)      HPI:  54 yo female w/ PMHx significant only for recent dx of [place] Palsy.   Diagnosed w/ COVID(+) on 8/14 after having SOB. Patient is unvaccinated.   Presented to ED for worsening SOB & Cough.    Denies chest pain, abdominal pain, N/V.     ED: Patient was tachypnic & tachycardic desating to 85% on RA>> HFNC 50/60 sating 92%  Labs: DDimer 348, , AG 23, Lactate 2.1;   CXR: Diffuse b/l opacities;  EKG: Sinus Tachy    Admit to MICU for monitoring    (22 Aug 2021 18:15)    Interval History:   54 yo F who tested + for COVID on 8/14 (unvaccianted) presented to ED 8/22/21 after having worsening shortness of breath and cough. Patient was admitted to MICU, now on high flow NC, CXR shows diffuse bilateral opacities. S/p 1 dose of toci, started decadron 6q12 on 8/23/21. Patient has been on steroids since 7/26 for [place] Palsy, therefore we started atovaquone 1500 ml per day for PCP prophylaxis. Patient stable in MICU, high flow decreased since 8/28, inflammatory markers all trending down. Lasix made patient hypotensive, so we DC'd, gave two boluses and started Midodrine.  Patient has been educated multiple times on the need to prone herself, continues to need reminding. Dexa was decreased to qD 8/30        Home Medications:  buPROPion 150 mg/12 hours (SR) oral tablet, extended release: 1 tab(s) orally once a day (23 Aug 2021 09:13)  DULoxetine 60 mg oral delayed release capsule: 2 tab(s) orally once a day (23 Aug 2021 09:13)    Allergies:  codeine (Unknown)  erythromycin (Unknown)  sulfa drugs (Unknown)      Hospital Medications:  atovaquone  Suspension 1500 milliGRAM(s) Oral daily  buPROPion XL (24-Hour) . 150 milliGRAM(s) Oral daily  chlorhexidine 4% Liquid 1 Application(s) Topical <User Schedule>  dexAMETHasone  Injectable 6 milliGRAM(s) IV Push daily  DULoxetine 120 milliGRAM(s) Oral daily  enoxaparin Injectable 40 milliGRAM(s) SubCutaneous two times a day  lactulose Syrup 20 Gram(s) Oral two times a day  pantoprazole    Tablet 40 milliGRAM(s) Oral before breakfast  polyethylene glycol 3350 17 Gram(s) Oral daily  senna 2 Tablet(s) Oral at bedtime  traZODone 50 milliGRAM(s) Oral at bedtime      Vital Signs Last 24 Hrs  T(C): 36.2 (31 Aug 2021 07:53), Max: 36.2 (30 Aug 2021 15:30)  T(F): 97.2 (31 Aug 2021 07:53), Max: 97.2 (31 Aug 2021 07:53)  HR: 101 (31 Aug 2021 12:00) (60 - 101)  BP: 108/64 (31 Aug 2021 12:00) (104/58 - 135/65)  BP(mean): 78 (31 Aug 2021 05:00) (78 - 100)  RR: 18 (31 Aug 2021 12:00) (18 - 52)  SpO2: 92% (31 Aug 2021 12:00) (89% - 97%)    I&O's Summary    30 Aug 2021 07:01  -  31 Aug 2021 07:00  --------------------------------------------------------  IN: 0 mL / OUT: 700 mL / NET: -700 mL        LABS:                        13.4   9.27  )-----------( 329      ( 31 Aug 2021 08:29 )             41.8       08-31    135  |  97<L>  |  31<H>  ----------------------------<  96  4.9   |  28  |  0.7    Ca    9.5      31 Aug 2021 08:29    TPro  5.4<L>  /  Alb  3.4<L>  /  TBili  0.4  /  DBili  x   /  AST  27  /  ALT  52<H>  /  AlkPhos  56  08-31          PHYSICAL EXAM:  GENERAL: NAD, lying in bed comfortably  HEAD: Atraumatic, Normocephalic  EYES: conjunctiva and sclera clear  NECK: Supple, No JVD  CHEST/LUNG: Crackles on auscultation on BL posterior lung fields   HEART: Regular rate and rhythm;   ABDOMEN: Bowel sounds present; Soft, Nontender,  EXTREMITIES: No clubbing, cyanosis, or edema  NERVOUS SYSTEM:  Alert & Oriented X3, speech clear. L sided bell's palsy/facial muscles paresis                Patient is a 55y old  Female who presents with a chief complaint of SOB/Cough (30 Aug 2021 10:04)    Attending Note: Pt seen and examined at bedside. No cp or sob. Pt is on HFNC       HPI:  54 yo female w/ PMHx significant only for recent dx of [place] Palsy.   Diagnosed w/ COVID(+) on 8/14 after having SOB. Patient is unvaccinated.   Presented to ED for worsening SOB & Cough.    Denies chest pain, abdominal pain, N/V.     ED: Patient was tachypnic & tachycardic desating to 85% on RA>> HFNC 50/60 sating 92%  Labs: DDimer 348, , AG 23, Lactate 2.1;   CXR: Diffuse b/l opacities;  EKG: Sinus Tachy    Admit to MICU for monitoring    (22 Aug 2021 18:15)    Interval History:   54 yo F who tested + for COVID on 8/14 (unvaccianted) presented to ED 8/22/21 after having worsening shortness of breath and cough. Patient was admitted to MICU, now on high flow NC, CXR shows diffuse bilateral opacities. S/p 1 dose of toci, started decadron 6q12 on 8/23/21. Patient has been on steroids since 7/26 for [place] Palsy, therefore we started atovaquone 1500 ml per day for PCP prophylaxis. Patient stable in MICU, high flow decreased since 8/28, inflammatory markers all trending down. Lasix made patient hypotensive, so we DC'd, gave two boluses and started Midodrine.  Patient has been educated multiple times on the need to prone herself, continues to need reminding. Dexa was decreased to qD 8/30        Home Medications:  buPROPion 150 mg/12 hours (SR) oral tablet, extended release: 1 tab(s) orally once a day (23 Aug 2021 09:13)  DULoxetine 60 mg oral delayed release capsule: 2 tab(s) orally once a day (23 Aug 2021 09:13)    Allergies:  codeine (Unknown)  erythromycin (Unknown)  sulfa drugs (Unknown)      Hospital Medications:  atovaquone  Suspension 1500 milliGRAM(s) Oral daily  buPROPion XL (24-Hour) . 150 milliGRAM(s) Oral daily  chlorhexidine 4% Liquid 1 Application(s) Topical <User Schedule>  dexAMETHasone  Injectable 6 milliGRAM(s) IV Push daily  DULoxetine 120 milliGRAM(s) Oral daily  enoxaparin Injectable 40 milliGRAM(s) SubCutaneous two times a day  lactulose Syrup 20 Gram(s) Oral two times a day  pantoprazole    Tablet 40 milliGRAM(s) Oral before breakfast  polyethylene glycol 3350 17 Gram(s) Oral daily  senna 2 Tablet(s) Oral at bedtime  traZODone 50 milliGRAM(s) Oral at bedtime      Vital Signs Last 24 Hrs  T(C): 36.2 (31 Aug 2021 07:53), Max: 36.2 (30 Aug 2021 15:30)  T(F): 97.2 (31 Aug 2021 07:53), Max: 97.2 (31 Aug 2021 07:53)  HR: 101 (31 Aug 2021 12:00) (60 - 101)  BP: 108/64 (31 Aug 2021 12:00) (104/58 - 135/65)  BP(mean): 78 (31 Aug 2021 05:00) (78 - 100)  RR: 18 (31 Aug 2021 12:00) (18 - 52)  SpO2: 92% (31 Aug 2021 12:00) (89% - 97%)    I&O's Summary    30 Aug 2021 07:01  -  31 Aug 2021 07:00  --------------------------------------------------------  IN: 0 mL / OUT: 700 mL / NET: -700 mL        LABS:                        13.4   9.27  )-----------( 329      ( 31 Aug 2021 08:29 )             41.8       08-31    135  |  97<L>  |  31<H>  ----------------------------<  96  4.9   |  28  |  0.7    Ca    9.5      31 Aug 2021 08:29    TPro  5.4<L>  /  Alb  3.4<L>  /  TBili  0.4  /  DBili  x   /  AST  27  /  ALT  52<H>  /  AlkPhos  56  08-31          PHYSICAL EXAM:  GENERAL: NAD, lying in bed comfortably  HEAD: Atraumatic, Normocephalic  EYES: conjunctiva and sclera clear  NECK: Supple, No JVD  CHEST/LUNG: Crackles on auscultation on BL posterior lung fields   HEART: Regular rate and rhythm;   ABDOMEN: Bowel sounds present; Soft, Nontender,  EXTREMITIES: No clubbing, cyanosis, or edema  NERVOUS SYSTEM:  Alert & Oriented X3, speech clear. L sided bell's palsy/facial muscles paresis

## 2021-08-31 NOTE — PROGRESS NOTE ADULT - ASSESSMENT
# Acute hypoxic respiratory failure  # Severe COVID PNA  - S/p 1 dose tocilizumab 8/23/21  - Continue Decadron 6gIV q24  - Wean O2 as tolerated, on HFNC 40/50  - LE doppler: no DVT  - Inflammatory markers ferritin 470, procalcitonin 0.03, CRP 5, d-dimer 288  - CXR shows BL opacities  - midodrine dc'd monitor BP  - lasix were previously dc'd due to low BP     #Hypothyroidism  - TSH 0.05  - repeat TSH in 4-6 weeks outpatient, currently pt has an infection     # Metabolic acidosis 2/2 Lactic acidosis  - Trend lactate  - Correct electrolytes as needed     # H/O Bell's Palsy  - Stable  - Steroid use since 7/26/21, PCP prophylaxis atovaquone 1500 ml     # Major Depressive Disorder  - Continue home meds    # DVT prophylaxis: Lovenox BID due to BMI>30  # GI prophylaxis: Protonix  # Diet: Regular  # Code status: Full code  # Disposition: ED3

## 2021-08-31 NOTE — PROGRESS NOTE ADULT - SUBJECTIVE AND OBJECTIVE BOX
Patient is a 55y old  Female who presents with a chief complaint of SOB/Cough (31 Aug 2021 12:35)        Over Night Events:  remains on HFNCO2.  on 40 liters 50%.  Off pressors         ROS:     All ROS are negative except HPI         PHYSICAL EXAM    ICU Vital Signs Last 24 Hrs  T(C): 36.2 (31 Aug 2021 07:53), Max: 36.2 (31 Aug 2021 07:53)  T(F): 97.2 (31 Aug 2021 07:53), Max: 97.2 (31 Aug 2021 07:53)  HR: 101 (31 Aug 2021 12:00) (60 - 101)  BP: 108/64 (31 Aug 2021 12:00) (104/58 - 123/65)  BP(mean): 78 (31 Aug 2021 05:00) (78 - 78)  ABP: --  ABP(mean): --  RR: 18 (31 Aug 2021 12:00) (18 - 18)  SpO2: 92% (31 Aug 2021 12:00) (92% - 97%)      CONSTITUTIONAL:  Well nourished.  NAD    ENT:   Airway patent,   Mouth with normal mucosa.   No thrush    EYES:   Pupils equal,   Round and reactive to light.    CARDIAC:   Normal rate,   Regular rhythm.    No edema      Vascular:  Normal systolic impulse  No Carotid bruits    RESPIRATORY:   No wheezing  Bilateral crackles   Normal chest expansion  Not tachypneic,  No use of accessory muscles    GASTROINTESTINAL:  Abdomen soft,   Non-tender,   No guarding,   + BS    MUSCULOSKELETAL:   Range of motion is not limited,  No clubbing, cyanosis    NEUROLOGICAL:   Alert and oriented   No motor  deficits.    SKIN:   Skin normal color for race,   Warm and dry  No evidence of rash.    PSYCHIATRIC:   Normal mood and affect.   No apparent risk to self or others.    HEMATOLOGICAL:  No cervical  lymphadenopathy.  no inguinal lymphadenopathy      08-30-21 @ 07:01  -  08-31-21 @ 07:00  --------------------------------------------------------  IN:  Total IN: 0 mL    OUT:    Voided (mL): 700 mL  Total OUT: 700 mL    Total NET: -700 mL          LABS:                            13.4   9.27  )-----------( 329      ( 31 Aug 2021 08:29 )             41.8                                               08-31    135  |  97<L>  |  31<H>  ----------------------------<  96  4.9   |  28  |  0.7    Ca    9.5      31 Aug 2021 08:29    TPro  5.4<L>  /  Alb  3.4<L>  /  TBili  0.4  /  DBili  x   /  AST  27  /  ALT  52<H>  /  AlkPhos  56  08-31                                                                                           LIVER FUNCTIONS - ( 31 Aug 2021 08:29 )  Alb: 3.4 g/dL / Pro: 5.4 g/dL / ALK PHOS: 56 U/L / ALT: 52 U/L / AST: 27 U/L / GGT: x                                                                                                                                       MEDICATIONS  (STANDING):  atovaquone  Suspension 1500 milliGRAM(s) Oral daily  buPROPion XL (24-Hour) . 150 milliGRAM(s) Oral daily  chlorhexidine 4% Liquid 1 Application(s) Topical <User Schedule>  dexAMETHasone  Injectable 6 milliGRAM(s) IV Push daily  DULoxetine 120 milliGRAM(s) Oral daily  enoxaparin Injectable 40 milliGRAM(s) SubCutaneous two times a day  lactulose Syrup 20 Gram(s) Oral two times a day  pantoprazole    Tablet 40 milliGRAM(s) Oral before breakfast  polyethylene glycol 3350 17 Gram(s) Oral daily  senna 2 Tablet(s) Oral at bedtime  traZODone 50 milliGRAM(s) Oral at bedtime    MEDICATIONS  (PRN):      New X-rays reviewed:                                                                                  ECHO    CXR interpreted by me:

## 2021-08-31 NOTE — PROGRESS NOTE ADULT - ASSESSMENT
IMPRESSION:    Acute Hypoxic Respiratory Failure on HHFNC 60/80  Severe COVID pneumonia SP TOCI   Hyponatremia improved  HO [place] Palsy    PLAN:    CNS:  Avoid CNS depressants.      HEENT: Oral care    PULMONARY:  HOB @45 degrees.  HFNC.  Wean O2 as tolerated.  Dexa 6mg IV q 24 D#10 ; incentive spirometry    CARDIOVASCULAR: AVoid volume overload     GI: GI prophylaxis. Diet as tolerated    RENAL:  Follow up lytes.  Correct as needed.      INFECTIOUS DISEASE: Follow up cultures. trend markers    HEMATOLOGICAL:  DVT prophylaxis.  LMWH     ENDOCRINE:  Follow up FS.      MUSCULOSKELETAL:  OOB to simair     ED3    IMPRESSION:    Acute Hypoxic Respiratory Failure   Severe COVID pneumonia SP TOCI   Hyponatremia improved  HO [place] Palsy    PLAN:    CNS:  Avoid CNS depressants.      HEENT: Oral care    PULMONARY:  HOB @45 degrees.  HFNC.  Wean O2 as tolerated.  Dexa 6mg IV q 24 D#10 ; incentive spirometry    CARDIOVASCULAR: AVoid volume overload     GI: GI prophylaxis. Diet as tolerated    RENAL:  Follow up lytes.  Correct as needed.      INFECTIOUS DISEASE: Follow up cultures. trend markers    HEMATOLOGICAL:  DVT prophylaxis.  LMWH     ENDOCRINE:  Follow up FS.      MUSCULOSKELETAL:  OOB to saige     ED3

## 2021-09-01 LAB
ALBUMIN SERPL ELPH-MCNC: 3.3 G/DL — LOW (ref 3.5–5.2)
ALP SERPL-CCNC: 54 U/L — SIGNIFICANT CHANGE UP (ref 30–115)
ALT FLD-CCNC: 50 U/L — HIGH (ref 0–41)
ANION GAP SERPL CALC-SCNC: 8 MMOL/L — SIGNIFICANT CHANGE UP (ref 7–14)
AST SERPL-CCNC: 20 U/L — SIGNIFICANT CHANGE UP (ref 0–41)
BASOPHILS # BLD AUTO: 0.03 K/UL — SIGNIFICANT CHANGE UP (ref 0–0.2)
BASOPHILS NFR BLD AUTO: 0.4 % — SIGNIFICANT CHANGE UP (ref 0–1)
BILIRUB SERPL-MCNC: 0.5 MG/DL — SIGNIFICANT CHANGE UP (ref 0.2–1.2)
BUN SERPL-MCNC: 30 MG/DL — HIGH (ref 10–20)
CALCIUM SERPL-MCNC: 9.6 MG/DL — SIGNIFICANT CHANGE UP (ref 8.5–10.1)
CHLORIDE SERPL-SCNC: 98 MMOL/L — SIGNIFICANT CHANGE UP (ref 98–110)
CO2 SERPL-SCNC: 30 MMOL/L — SIGNIFICANT CHANGE UP (ref 17–32)
CREAT SERPL-MCNC: 0.8 MG/DL — SIGNIFICANT CHANGE UP (ref 0.7–1.5)
CRP SERPL-MCNC: <3 MG/L — SIGNIFICANT CHANGE UP
EOSINOPHIL # BLD AUTO: 0.24 K/UL — SIGNIFICANT CHANGE UP (ref 0–0.7)
EOSINOPHIL NFR BLD AUTO: 2.8 % — SIGNIFICANT CHANGE UP (ref 0–8)
FERRITIN SERPL-MCNC: 516 NG/ML — HIGH (ref 15–150)
FUNGITELL: <31 PG/ML — SIGNIFICANT CHANGE UP
GLUCOSE SERPL-MCNC: 86 MG/DL — SIGNIFICANT CHANGE UP (ref 70–99)
HCT VFR BLD CALC: 40.7 % — SIGNIFICANT CHANGE UP (ref 37–47)
HGB BLD-MCNC: 13 G/DL — SIGNIFICANT CHANGE UP (ref 12–16)
IMM GRANULOCYTES NFR BLD AUTO: 4 % — HIGH (ref 0.1–0.3)
LYMPHOCYTES # BLD AUTO: 1.83 K/UL — SIGNIFICANT CHANGE UP (ref 1.2–3.4)
LYMPHOCYTES # BLD AUTO: 21.7 % — SIGNIFICANT CHANGE UP (ref 20.5–51.1)
MAGNESIUM SERPL-MCNC: 2.3 MG/DL — SIGNIFICANT CHANGE UP (ref 1.8–2.4)
MCHC RBC-ENTMCNC: 27.7 PG — SIGNIFICANT CHANGE UP (ref 27–31)
MCHC RBC-ENTMCNC: 31.9 G/DL — LOW (ref 32–37)
MCV RBC AUTO: 86.8 FL — SIGNIFICANT CHANGE UP (ref 81–99)
MONOCYTES # BLD AUTO: 0.79 K/UL — HIGH (ref 0.1–0.6)
MONOCYTES NFR BLD AUTO: 9.4 % — HIGH (ref 1.7–9.3)
NEUTROPHILS # BLD AUTO: 5.2 K/UL — SIGNIFICANT CHANGE UP (ref 1.4–6.5)
NEUTROPHILS NFR BLD AUTO: 61.7 % — SIGNIFICANT CHANGE UP (ref 42.2–75.2)
NRBC # BLD: 0 /100 WBCS — SIGNIFICANT CHANGE UP (ref 0–0)
PLATELET # BLD AUTO: 309 K/UL — SIGNIFICANT CHANGE UP (ref 130–400)
POTASSIUM SERPL-MCNC: 4.5 MMOL/L — SIGNIFICANT CHANGE UP (ref 3.5–5)
POTASSIUM SERPL-SCNC: 4.5 MMOL/L — SIGNIFICANT CHANGE UP (ref 3.5–5)
PROCALCITONIN SERPL-MCNC: 0.03 NG/ML — SIGNIFICANT CHANGE UP (ref 0.02–0.1)
PROT SERPL-MCNC: 5.2 G/DL — LOW (ref 6–8)
RBC # BLD: 4.69 M/UL — SIGNIFICANT CHANGE UP (ref 4.2–5.4)
RBC # FLD: 14.9 % — HIGH (ref 11.5–14.5)
SODIUM SERPL-SCNC: 136 MMOL/L — SIGNIFICANT CHANGE UP (ref 135–146)
WBC # BLD: 8.43 K/UL — SIGNIFICANT CHANGE UP (ref 4.8–10.8)
WBC # FLD AUTO: 8.43 K/UL — SIGNIFICANT CHANGE UP (ref 4.8–10.8)

## 2021-09-01 PROCEDURE — 71045 X-RAY EXAM CHEST 1 VIEW: CPT | Mod: 26

## 2021-09-01 PROCEDURE — 99232 SBSQ HOSP IP/OBS MODERATE 35: CPT

## 2021-09-01 PROCEDURE — 99233 SBSQ HOSP IP/OBS HIGH 50: CPT

## 2021-09-01 RX ADMIN — BUPROPION HYDROCHLORIDE 150 MILLIGRAM(S): 150 TABLET, EXTENDED RELEASE ORAL at 12:59

## 2021-09-01 RX ADMIN — Medication 6 MILLIGRAM(S): at 05:17

## 2021-09-01 RX ADMIN — SENNA PLUS 2 TABLET(S): 8.6 TABLET ORAL at 21:21

## 2021-09-01 RX ADMIN — PANTOPRAZOLE SODIUM 40 MILLIGRAM(S): 20 TABLET, DELAYED RELEASE ORAL at 05:18

## 2021-09-01 RX ADMIN — LACTULOSE 20 GRAM(S): 10 SOLUTION ORAL at 05:17

## 2021-09-01 RX ADMIN — POLYETHYLENE GLYCOL 3350 17 GRAM(S): 17 POWDER, FOR SOLUTION ORAL at 12:59

## 2021-09-01 RX ADMIN — Medication 50 MILLIGRAM(S): at 21:21

## 2021-09-01 RX ADMIN — ENOXAPARIN SODIUM 40 MILLIGRAM(S): 100 INJECTION SUBCUTANEOUS at 05:17

## 2021-09-01 RX ADMIN — DULOXETINE HYDROCHLORIDE 120 MILLIGRAM(S): 30 CAPSULE, DELAYED RELEASE ORAL at 12:56

## 2021-09-01 RX ADMIN — ATOVAQUONE 1500 MILLIGRAM(S): 750 SUSPENSION ORAL at 12:59

## 2021-09-01 RX ADMIN — CHLORHEXIDINE GLUCONATE 1 APPLICATION(S): 213 SOLUTION TOPICAL at 05:17

## 2021-09-01 NOTE — PROGRESS NOTE ADULT - ASSESSMENT
IMPRESSION:    Acute Hypoxic Respiratory Failure  Severe COVID pneumonia SP TOCI   Hyponatremia improved  HO [place] Palsy    PLAN:    CNS:  Avoid CNS depressants.      HEENT: Oral care    PULMONARY:  HOB @45 degrees.  HFNC.  Wean O2 as tolerated.  Dexa 6mg IV q 24 D#10 ; incentive spirometry    CARDIOVASCULAR: AVoid volume overload     GI: GI prophylaxis. Diet as tolerated    RENAL:  Follow up lytes.  Correct as needed.      INFECTIOUS DISEASE: Follow up cultures. trend markers    HEMATOLOGICAL:  DVT prophylaxis.  LMWH     ENDOCRINE:  Follow up FS.      MUSCULOSKELETAL:  OOB to saige     ED3

## 2021-09-01 NOTE — PROGRESS NOTE ADULT - SUBJECTIVE AND OBJECTIVE BOX
Patient is a 55y old  Female who presents with a chief complaint of SOB/Cough (31 Aug 2021 15:40)        Over Night Events:  on HFNCO2.  40 liters 50%.  GONZALEZ.  No SOB at rest.  Off pressors         ROS:     All ROS are negative except HPI         PHYSICAL EXAM    ICU Vital Signs Last 24 Hrs  T(C): 36.7 (31 Aug 2021 20:00), Max: 36.8 (31 Aug 2021 16:46)  T(F): 98.1 (31 Aug 2021 20:00), Max: 98.2 (31 Aug 2021 16:46)  HR: 80 (01 Sep 2021 05:14) (66 - 101)  BP: 101/52 (01 Sep 2021 05:14) (101/52 - 117/61)  BP(mean): 73 (01 Sep 2021 05:14) (73 - 88)  ABP: --  ABP(mean): --  RR: 18 (01 Sep 2021 05:14) (18 - 22)  SpO2: 97% (01 Sep 2021 05:14) (92% - 97%)      CONSTITUTIONAL:  Well nourished.  NAD    ENT:   Airway patent,   Mouth with normal mucosa.   No thrush    EYES:   Pupils equal,   Round and reactive to light.    CARDIAC:   Normal rate,   Regular rhythm.    No edema      Vascular:  Normal systolic impulse  No Carotid bruits    RESPIRATORY:   No wheezing  Bilateral crackles   Normal chest expansion  Not tachypneic,  No use of accessory muscles    GASTROINTESTINAL:  Abdomen soft,   Non-tender,   No guarding,   + BS    MUSCULOSKELETAL:   Range of motion is not limited,  No clubbing, cyanosis    NEUROLOGICAL:   Alert and oriented   No motor  deficits.    SKIN:   Skin normal color for race,   Warm and dry  No evidence of rash.    PSYCHIATRIC:   Normal mood and affect.   No apparent risk to self or others.    HEMATOLOGICAL:  No cervical  lymphadenopathy.  no inguinal lymphadenopathy      08-31-21 @ 07:01  -  09-01-21 @ 07:00  --------------------------------------------------------  IN:    Oral Fluid: 200 mL  Total IN: 200 mL    OUT:    Voided (mL): 1450 mL  Total OUT: 1450 mL    Total NET: -1250 mL          LABS:                            13.0   8.43  )-----------( 309      ( 01 Sep 2021 04:30 )             40.7                                               08-31    135  |  97<L>  |  31<H>  ----------------------------<  96  4.9   |  28  |  0.7    Ca    9.5      31 Aug 2021 08:29    TPro  5.4<L>  /  Alb  3.4<L>  /  TBili  0.4  /  DBili  x   /  AST  27  /  ALT  52<H>  /  AlkPhos  56  08-31                                                                                           LIVER FUNCTIONS - ( 31 Aug 2021 08:29 )  Alb: 3.4 g/dL / Pro: 5.4 g/dL / ALK PHOS: 56 U/L / ALT: 52 U/L / AST: 27 U/L / GGT: x                                                                                                                                       MEDICATIONS  (STANDING):  atovaquone  Suspension 1500 milliGRAM(s) Oral daily  buPROPion XL (24-Hour) . 150 milliGRAM(s) Oral daily  chlorhexidine 4% Liquid 1 Application(s) Topical <User Schedule>  dexAMETHasone  Injectable 6 milliGRAM(s) IV Push daily  DULoxetine 120 milliGRAM(s) Oral daily  enoxaparin Injectable 40 milliGRAM(s) SubCutaneous two times a day  lactulose Syrup 20 Gram(s) Oral two times a day  pantoprazole    Tablet 40 milliGRAM(s) Oral before breakfast  polyethylene glycol 3350 17 Gram(s) Oral daily  senna 2 Tablet(s) Oral at bedtime  traZODone 50 milliGRAM(s) Oral at bedtime    MEDICATIONS  (PRN):      New X-rays reviewed:                                                                                  ECHO    CXR interpreted by me:  Bilateral infiltrates L>R

## 2021-09-01 NOTE — PROGRESS NOTE ADULT - SUBJECTIVE AND OBJECTIVE BOX
Patient is a 55y old  Female who presents with a chief complaint of SOB/Cough (01 Sep 2021 07:47)      HPI:  56 yo female w/ PMHx significant only for recent dx of [place] Palsy.   Diagnosed w/ COVID(+) on 8/14 after having SOB. Patient is unvaccinated.   Presented to ED for worsening SOB & Cough.    Denies chest pain, abdominal pain, N/V.     ED: Patient was tachypnic & tachycardic desating to 85% on RA>> HFNC 50/60 sating 92%  Labs: DDimer 348, , AG 23, Lactate 2.1;   CXR: Diffuse b/l opacities;  EKG: Sinus Tachy    Admit to MICU for monitoring    (22 Aug 2021 18:15)      PAST MEDICAL & SURGICAL HISTORY:      Home Medications:  buPROPion 150 mg/12 hours (SR) oral tablet, extended release: 1 tab(s) orally once a day (23 Aug 2021 09:13)  DULoxetine 60 mg oral delayed release capsule: 2 tab(s) orally once a day (23 Aug 2021 09:13)      .  Tobacco use:   EtOH use:  Illicit drug use:    Living situation:    Allergies:  codeine (Unknown)  erythromycin (Unknown)  sulfa drugs (Unknown)      FAMILY HISTORY:      Hospital Medications:  atovaquone  Suspension 1500 milliGRAM(s) Oral daily  buPROPion XL (24-Hour) . 150 milliGRAM(s) Oral daily  chlorhexidine 4% Liquid 1 Application(s) Topical <User Schedule>  dexAMETHasone  Injectable 6 milliGRAM(s) IV Push daily  DULoxetine 120 milliGRAM(s) Oral daily  enoxaparin Injectable 40 milliGRAM(s) SubCutaneous two times a day  lactulose Syrup 20 Gram(s) Oral two times a day  pantoprazole    Tablet 40 milliGRAM(s) Oral before breakfast  polyethylene glycol 3350 17 Gram(s) Oral daily  senna 2 Tablet(s) Oral at bedtime  traZODone 50 milliGRAM(s) Oral at bedtime      Vital Signs Last 24 Hrs  T(C): 37.1 (01 Sep 2021 09:45), Max: 37.1 (01 Sep 2021 09:45)  T(F): 98.8 (01 Sep 2021 09:45), Max: 98.8 (01 Sep 2021 09:45)  HR: 94 (01 Sep 2021 09:45) (69 - 101)  BP: 106/56 (01 Sep 2021 09:45) (101/52 - 117/61)  BP(mean): 73 (01 Sep 2021 05:14) (73 - 88)  RR: 18 (01 Sep 2021 09:45) (18 - 22)  SpO2: 98% (01 Sep 2021 09:45) (92% - 98%)    I&O's Summary    31 Aug 2021 07:01  -  01 Sep 2021 07:00  --------------------------------------------------------  IN: 200 mL / OUT: 1450 mL / NET: -1250 mL        LABS:                        13.0   8.43  )-----------( 309      ( 01 Sep 2021 04:30 )             40.7       09-01    136  |  98  |  30<H>  ----------------------------<  86  4.5   |  30  |  0.8    Ca    9.6      01 Sep 2021 04:30  Mg     2.3     09-01    TPro  5.2<L>  /  Alb  3.3<L>  /  TBili  0.5  /  DBili  x   /  AST  20  /  ALT  50<H>  /  AlkPhos  54  09-01        PHYSICAL EXAM:  GENERAL: NAD, lying in bed comfortably  HEAD: Atraumatic, Normocephalic  EYES: conjunctiva and sclera clear  NECK: Supple, No JVD  CHEST/LUNG: Crackles on auscultation on BL posterior lung fields   HEART: Regular rate and rhythm;   ABDOMEN: Bowel sounds present; Soft, Nontender,  EXTREMITIES: No clubbing, cyanosis, or edema  NERVOUS SYSTEM:  Alert & Oriented X3, speech clear. L sided bell's palsy/facial muscles paresis

## 2021-09-02 LAB
ALBUMIN SERPL ELPH-MCNC: 3.3 G/DL — LOW (ref 3.5–5.2)
ALP SERPL-CCNC: 52 U/L — SIGNIFICANT CHANGE UP (ref 30–115)
ALT FLD-CCNC: 45 U/L — HIGH (ref 0–41)
ANION GAP SERPL CALC-SCNC: 9 MMOL/L — SIGNIFICANT CHANGE UP (ref 7–14)
AST SERPL-CCNC: 20 U/L — SIGNIFICANT CHANGE UP (ref 0–41)
BASOPHILS # BLD AUTO: 0.03 K/UL — SIGNIFICANT CHANGE UP (ref 0–0.2)
BASOPHILS NFR BLD AUTO: 0.4 % — SIGNIFICANT CHANGE UP (ref 0–1)
BILIRUB SERPL-MCNC: 0.4 MG/DL — SIGNIFICANT CHANGE UP (ref 0.2–1.2)
BUN SERPL-MCNC: 32 MG/DL — HIGH (ref 10–20)
CALCIUM SERPL-MCNC: 9.3 MG/DL — SIGNIFICANT CHANGE UP (ref 8.5–10.1)
CHLORIDE SERPL-SCNC: 101 MMOL/L — SIGNIFICANT CHANGE UP (ref 98–110)
CO2 SERPL-SCNC: 29 MMOL/L — SIGNIFICANT CHANGE UP (ref 17–32)
CREAT SERPL-MCNC: 0.8 MG/DL — SIGNIFICANT CHANGE UP (ref 0.7–1.5)
CRP SERPL-MCNC: <3 MG/L — SIGNIFICANT CHANGE UP
D DIMER BLD IA.RAPID-MCNC: 154 NG/ML DDU — SIGNIFICANT CHANGE UP (ref 0–230)
EOSINOPHIL # BLD AUTO: 0.29 K/UL — SIGNIFICANT CHANGE UP (ref 0–0.7)
EOSINOPHIL NFR BLD AUTO: 3.7 % — SIGNIFICANT CHANGE UP (ref 0–8)
GLUCOSE SERPL-MCNC: 80 MG/DL — SIGNIFICANT CHANGE UP (ref 70–99)
HCT VFR BLD CALC: 38.9 % — SIGNIFICANT CHANGE UP (ref 37–47)
HGB BLD-MCNC: 12.6 G/DL — SIGNIFICANT CHANGE UP (ref 12–16)
IMM GRANULOCYTES NFR BLD AUTO: 2.8 % — HIGH (ref 0.1–0.3)
LYMPHOCYTES # BLD AUTO: 2.17 K/UL — SIGNIFICANT CHANGE UP (ref 1.2–3.4)
LYMPHOCYTES # BLD AUTO: 27.9 % — SIGNIFICANT CHANGE UP (ref 20.5–51.1)
MCHC RBC-ENTMCNC: 28.1 PG — SIGNIFICANT CHANGE UP (ref 27–31)
MCHC RBC-ENTMCNC: 32.4 G/DL — SIGNIFICANT CHANGE UP (ref 32–37)
MCV RBC AUTO: 86.6 FL — SIGNIFICANT CHANGE UP (ref 81–99)
MONOCYTES # BLD AUTO: 0.99 K/UL — HIGH (ref 0.1–0.6)
MONOCYTES NFR BLD AUTO: 12.7 % — HIGH (ref 1.7–9.3)
NEUTROPHILS # BLD AUTO: 4.07 K/UL — SIGNIFICANT CHANGE UP (ref 1.4–6.5)
NEUTROPHILS NFR BLD AUTO: 52.5 % — SIGNIFICANT CHANGE UP (ref 42.2–75.2)
NRBC # BLD: 0 /100 WBCS — SIGNIFICANT CHANGE UP (ref 0–0)
NT-PROBNP SERPL-SCNC: 79 PG/ML — SIGNIFICANT CHANGE UP (ref 0–300)
PLATELET # BLD AUTO: 261 K/UL — SIGNIFICANT CHANGE UP (ref 130–400)
POTASSIUM SERPL-MCNC: 4.4 MMOL/L — SIGNIFICANT CHANGE UP (ref 3.5–5)
POTASSIUM SERPL-SCNC: 4.4 MMOL/L — SIGNIFICANT CHANGE UP (ref 3.5–5)
PROCALCITONIN SERPL-MCNC: 0.04 NG/ML — SIGNIFICANT CHANGE UP (ref 0.02–0.1)
PROT SERPL-MCNC: 5.1 G/DL — LOW (ref 6–8)
RBC # BLD: 4.49 M/UL — SIGNIFICANT CHANGE UP (ref 4.2–5.4)
RBC # FLD: 14.9 % — HIGH (ref 11.5–14.5)
SODIUM SERPL-SCNC: 139 MMOL/L — SIGNIFICANT CHANGE UP (ref 135–146)
WBC # BLD: 7.77 K/UL — SIGNIFICANT CHANGE UP (ref 4.8–10.8)
WBC # FLD AUTO: 7.77 K/UL — SIGNIFICANT CHANGE UP (ref 4.8–10.8)

## 2021-09-02 PROCEDURE — 71045 X-RAY EXAM CHEST 1 VIEW: CPT | Mod: 26

## 2021-09-02 PROCEDURE — 99233 SBSQ HOSP IP/OBS HIGH 50: CPT

## 2021-09-02 RX ADMIN — CHLORHEXIDINE GLUCONATE 1 APPLICATION(S): 213 SOLUTION TOPICAL at 05:32

## 2021-09-02 RX ADMIN — ENOXAPARIN SODIUM 40 MILLIGRAM(S): 100 INJECTION SUBCUTANEOUS at 17:29

## 2021-09-02 RX ADMIN — PANTOPRAZOLE SODIUM 40 MILLIGRAM(S): 20 TABLET, DELAYED RELEASE ORAL at 05:32

## 2021-09-02 RX ADMIN — Medication 50 MILLIGRAM(S): at 21:05

## 2021-09-02 RX ADMIN — SENNA PLUS 2 TABLET(S): 8.6 TABLET ORAL at 21:05

## 2021-09-02 RX ADMIN — Medication 6 MILLIGRAM(S): at 05:32

## 2021-09-02 RX ADMIN — BUPROPION HYDROCHLORIDE 150 MILLIGRAM(S): 150 TABLET, EXTENDED RELEASE ORAL at 12:18

## 2021-09-02 RX ADMIN — POLYETHYLENE GLYCOL 3350 17 GRAM(S): 17 POWDER, FOR SOLUTION ORAL at 11:59

## 2021-09-02 RX ADMIN — ENOXAPARIN SODIUM 40 MILLIGRAM(S): 100 INJECTION SUBCUTANEOUS at 05:32

## 2021-09-02 RX ADMIN — DULOXETINE HYDROCHLORIDE 120 MILLIGRAM(S): 30 CAPSULE, DELAYED RELEASE ORAL at 12:18

## 2021-09-02 RX ADMIN — ATOVAQUONE 1500 MILLIGRAM(S): 750 SUSPENSION ORAL at 12:00

## 2021-09-02 NOTE — PROGRESS NOTE ADULT - SUBJECTIVE AND OBJECTIVE BOX
Patient is a 55y old  Female who presents with a chief complaint of SOB/Cough (01 Sep 2021 10:42)      HPI:  54 yo female w/ PMHx significant only for recent dx of [place] Palsy.   Diagnosed w/ COVID(+) on 8/14 after having SOB. Patient is unvaccinated.   Presented to ED for worsening SOB & Cough.    Denies chest pain, abdominal pain, N/V.     ED: Patient was tachypnic & tachycardic desating to 85% on RA>> HFNC 50/60 sating 92%  Labs: DDimer 348, , AG 23, Lactate 2.1;   CXR: Diffuse b/l opacities;  EKG: Sinus Tachy    Admit to MICU for monitoring    (22 Aug 2021 18:15)      PAST MEDICAL & SURGICAL HISTORY:      Home Medications:  buPROPion 150 mg/12 hours (SR) oral tablet, extended release: 1 tab(s) orally once a day (23 Aug 2021 09:13)  DULoxetine 60 mg oral delayed release capsule: 2 tab(s) orally once a day (23 Aug 2021 09:13)      .  Tobacco use:   EtOH use:  Illicit drug use:    Living situation:    Allergies:  codeine (Unknown)  erythromycin (Unknown)  sulfa drugs (Unknown)      FAMILY HISTORY:      Hospital Medications:  atovaquone  Suspension 1500 milliGRAM(s) Oral daily  buPROPion XL (24-Hour) . 150 milliGRAM(s) Oral daily  chlorhexidine 4% Liquid 1 Application(s) Topical <User Schedule>  dexAMETHasone  Injectable 6 milliGRAM(s) IV Push daily  DULoxetine 120 milliGRAM(s) Oral daily  enoxaparin Injectable 40 milliGRAM(s) SubCutaneous two times a day  pantoprazole    Tablet 40 milliGRAM(s) Oral before breakfast  polyethylene glycol 3350 17 Gram(s) Oral daily  senna 2 Tablet(s) Oral at bedtime  traZODone 50 milliGRAM(s) Oral at bedtime      Vital Signs Last 24 Hrs  T(C): 35.6 (02 Sep 2021 09:00), Max: 37.1 (01 Sep 2021 19:51)  T(F): 96 (02 Sep 2021 09:00), Max: 98.7 (01 Sep 2021 19:51)  HR: 63 (02 Sep 2021 08:01) (63 - 83)  BP: 100/50 (02 Sep 2021 08:00) (100/50 - 124/74)  BP(mean): 72 (02 Sep 2021 08:00) (72 - 93)  RR: 16 (02 Sep 2021 08:00) (16 - 20)  SpO2: 97% (02 Sep 2021 08:01) (95% - 99%)    I&O's Summary    02 Sep 2021 07:01  -  02 Sep 2021 10:15  --------------------------------------------------------  IN: 200 mL / OUT: 0 mL / NET: 200 mL        LABS:                        12.6   7.77  )-----------( 261      ( 02 Sep 2021 05:47 )             38.9       09-02    139  |  101  |  32<H>  ----------------------------<  80  4.4   |  29  |  0.8    Ca    9.3      02 Sep 2021 05:47  Mg     2.3     09-01    TPro  5.1<L>  /  Alb  3.3<L>  /  TBili  0.4  /  DBili  x   /  AST  20  /  ALT  45<H>  /  AlkPhos  52  09-02            PHYSICAL EXAM:  GENERAL: NAD, lying in bed comfortably  HEAD: Atraumatic, Normocephalic  EYES: conjunctiva and sclera clear  NECK: Supple, No JVD  CHEST/LUNG: Crackles on auscultation on BL posterior lung fields   HEART: Regular rate and rhythm;   ABDOMEN: Bowel sounds present; Soft, Nontender,  EXTREMITIES: No clubbing, cyanosis, or edema  NERVOUS SYSTEM:  Alert & Oriented X3, speech clear. L sided bell's palsy/facial muscles paresis

## 2021-09-02 NOTE — PROGRESS NOTE ADULT - ASSESSMENT
# Acute hypoxic respiratory failure  # Severe COVID PNA  - S/p 1 dose tocilizumab 8/23/21  - Continue Decadron 6gIV q24  - Wean O2 as tolerated, on HFNC 40/50  - LE doppler: no DVT  - Inflammatory markers ferritin 470, procalcitonin 0.03, CRP 5, d-dimer 288  - CXR shows BL opacities  - midodrine dc'd monitor BP  - lasix were previously dc'd due to low BP   - currently on HFNC will titrate down to NC today and downgrade     #Hypothyroidism  - TSH 0.05  - repeat TSH in 4-6 weeks outpatient, currently pt has an infection     # Metabolic acidosis 2/2 Lactic acidosis  - Trend lactate  - Correct electrolytes as needed     # H/O Bell's Palsy  - Stable  - Steroid use since 7/26/21, PCP prophylaxis atovaquone 1500 ml     # Major Depressive Disorder  - Continue home meds    # DVT prophylaxis: Lovenox BID due to BMI>30  # GI prophylaxis: Protonix  # Diet: Regular  # Code status: Full code  # Disposition: ED3

## 2021-09-03 ENCOUNTER — TRANSCRIPTION ENCOUNTER (OUTPATIENT)
Age: 56
End: 2021-09-03

## 2021-09-03 LAB
ALBUMIN SERPL ELPH-MCNC: 3.3 G/DL — LOW (ref 3.5–5.2)
ALP SERPL-CCNC: 50 U/L — SIGNIFICANT CHANGE UP (ref 30–115)
ALT FLD-CCNC: 51 U/L — HIGH (ref 0–41)
ANION GAP SERPL CALC-SCNC: 7 MMOL/L — SIGNIFICANT CHANGE UP (ref 7–14)
AST SERPL-CCNC: 23 U/L — SIGNIFICANT CHANGE UP (ref 0–41)
BASOPHILS # BLD AUTO: 0.04 K/UL — SIGNIFICANT CHANGE UP (ref 0–0.2)
BASOPHILS NFR BLD AUTO: 0.5 % — SIGNIFICANT CHANGE UP (ref 0–1)
BILIRUB SERPL-MCNC: 0.3 MG/DL — SIGNIFICANT CHANGE UP (ref 0.2–1.2)
BUN SERPL-MCNC: 30 MG/DL — HIGH (ref 10–20)
CALCIUM SERPL-MCNC: 9.1 MG/DL — SIGNIFICANT CHANGE UP (ref 8.5–10.1)
CHLORIDE SERPL-SCNC: 102 MMOL/L — SIGNIFICANT CHANGE UP (ref 98–110)
CO2 SERPL-SCNC: 30 MMOL/L — SIGNIFICANT CHANGE UP (ref 17–32)
CREAT SERPL-MCNC: 0.7 MG/DL — SIGNIFICANT CHANGE UP (ref 0.7–1.5)
EOSINOPHIL # BLD AUTO: 0.29 K/UL — SIGNIFICANT CHANGE UP (ref 0–0.7)
EOSINOPHIL NFR BLD AUTO: 3.8 % — SIGNIFICANT CHANGE UP (ref 0–8)
FERRITIN SERPL-MCNC: 421 NG/ML — HIGH (ref 15–150)
FUNGITELL: <31 PG/ML — SIGNIFICANT CHANGE UP
GLUCOSE SERPL-MCNC: 84 MG/DL — SIGNIFICANT CHANGE UP (ref 70–99)
HCT VFR BLD CALC: 40.2 % — SIGNIFICANT CHANGE UP (ref 37–47)
HGB BLD-MCNC: 12.7 G/DL — SIGNIFICANT CHANGE UP (ref 12–16)
IMM GRANULOCYTES NFR BLD AUTO: 2.8 % — HIGH (ref 0.1–0.3)
LYMPHOCYTES # BLD AUTO: 2.02 K/UL — SIGNIFICANT CHANGE UP (ref 1.2–3.4)
LYMPHOCYTES # BLD AUTO: 26.5 % — SIGNIFICANT CHANGE UP (ref 20.5–51.1)
MCHC RBC-ENTMCNC: 27.9 PG — SIGNIFICANT CHANGE UP (ref 27–31)
MCHC RBC-ENTMCNC: 31.6 G/DL — LOW (ref 32–37)
MCV RBC AUTO: 88.4 FL — SIGNIFICANT CHANGE UP (ref 81–99)
MONOCYTES # BLD AUTO: 0.98 K/UL — HIGH (ref 0.1–0.6)
MONOCYTES NFR BLD AUTO: 12.9 % — HIGH (ref 1.7–9.3)
NEUTROPHILS # BLD AUTO: 4.07 K/UL — SIGNIFICANT CHANGE UP (ref 1.4–6.5)
NEUTROPHILS NFR BLD AUTO: 53.5 % — SIGNIFICANT CHANGE UP (ref 42.2–75.2)
NRBC # BLD: 0 /100 WBCS — SIGNIFICANT CHANGE UP (ref 0–0)
PLATELET # BLD AUTO: 228 K/UL — SIGNIFICANT CHANGE UP (ref 130–400)
POTASSIUM SERPL-MCNC: 4.6 MMOL/L — SIGNIFICANT CHANGE UP (ref 3.5–5)
POTASSIUM SERPL-SCNC: 4.6 MMOL/L — SIGNIFICANT CHANGE UP (ref 3.5–5)
PROT SERPL-MCNC: 5.1 G/DL — LOW (ref 6–8)
RBC # BLD: 4.55 M/UL — SIGNIFICANT CHANGE UP (ref 4.2–5.4)
RBC # FLD: 15 % — HIGH (ref 11.5–14.5)
SODIUM SERPL-SCNC: 139 MMOL/L — SIGNIFICANT CHANGE UP (ref 135–146)
WBC # BLD: 7.61 K/UL — SIGNIFICANT CHANGE UP (ref 4.8–10.8)
WBC # FLD AUTO: 7.61 K/UL — SIGNIFICANT CHANGE UP (ref 4.8–10.8)

## 2021-09-03 PROCEDURE — 99233 SBSQ HOSP IP/OBS HIGH 50: CPT

## 2021-09-03 RX ADMIN — POLYETHYLENE GLYCOL 3350 17 GRAM(S): 17 POWDER, FOR SOLUTION ORAL at 12:15

## 2021-09-03 RX ADMIN — Medication 6 MILLIGRAM(S): at 05:46

## 2021-09-03 RX ADMIN — BUPROPION HYDROCHLORIDE 150 MILLIGRAM(S): 150 TABLET, EXTENDED RELEASE ORAL at 12:15

## 2021-09-03 RX ADMIN — SENNA PLUS 2 TABLET(S): 8.6 TABLET ORAL at 22:00

## 2021-09-03 RX ADMIN — ENOXAPARIN SODIUM 40 MILLIGRAM(S): 100 INJECTION SUBCUTANEOUS at 05:46

## 2021-09-03 RX ADMIN — DULOXETINE HYDROCHLORIDE 120 MILLIGRAM(S): 30 CAPSULE, DELAYED RELEASE ORAL at 12:15

## 2021-09-03 RX ADMIN — CHLORHEXIDINE GLUCONATE 1 APPLICATION(S): 213 SOLUTION TOPICAL at 05:46

## 2021-09-03 RX ADMIN — ATOVAQUONE 1500 MILLIGRAM(S): 750 SUSPENSION ORAL at 12:16

## 2021-09-03 RX ADMIN — PANTOPRAZOLE SODIUM 40 MILLIGRAM(S): 20 TABLET, DELAYED RELEASE ORAL at 10:43

## 2021-09-03 RX ADMIN — ENOXAPARIN SODIUM 40 MILLIGRAM(S): 100 INJECTION SUBCUTANEOUS at 18:00

## 2021-09-03 NOTE — DISCHARGE NOTE PROVIDER - NSDCMRMEDTOKEN_GEN_ALL_CORE_FT
buPROPion 150 mg/12 hours (SR) oral tablet, extended release: 1 tab(s) orally once a day  DULoxetine 60 mg oral delayed release capsule: 2 tab(s) orally once a day  neomycin/polymyxin B/hydrocortisone 0.35%-10,000 units/mL-1% otic solution: 3 drop(s) in each affected ear 3 times a day   predniSONE 20 mg oral tablet: 3 tab(s) orally once a day    atovaquone 750 mg/5 mL oral suspension: 10 milliliter(s) orally once a day  buPROPion 150 mg/12 hours (SR) oral tablet, extended release: 1 tab(s) orally once a day  DULoxetine 60 mg oral delayed release capsule: 2 tab(s) orally once a day  neomycin/polymyxin B/hydrocortisone 0.35%-10,000 units/mL-1% otic solution: 3 drop(s) in each affected ear 3 times a day   predniSONE 10 mg oral tablet: Take 4 pills once a day for 7 days   Then, take 3 pills once a day for 7 days   Then, take 2 pills once a day for 7 days   Then, take 1 pill once a day for 7 days  Then take 5mg steroid prescription once a day for 7 days  predniSONE 5 mg oral tablet: 1 tab(s) orally once a day   To be taken after completion of the other prescription of 10mg tablets  traZODone 50 mg oral tablet: 1 tab(s) orally once a day (at bedtime)

## 2021-09-03 NOTE — ED ADULT NURSE REASSESSMENT NOTE - REASSESS COMMUNICATION
Pt received lying in bed on cardiac monitor. Pt is drowsy but arousable to stimuli. Pt denied chest pain, n/v/d/f/c. Pt able to speak in clear sentences. 3L nasal cannula noted for Oxygen. PT denied pain fever/chills. Will continue to monitor and treat per orders.

## 2021-09-03 NOTE — PROGRESS NOTE ADULT - ASSESSMENT
# Acute hypoxic respiratory failure  # Severe COVID PNA  - S/p 1 dose tocilizumab 8/23/21  - will taper steroids, starting with prednisone 40mg daily tomorrow   - Wean O2 as tolerated, on NC 4L now   - LE doppler: no DVT  - Inflammatory markers ferritin 470--> 421, procalcitonin 0.03--> 0.04, CRP 5 --> <3, d-dimer 288--> 154  - CXR shows BL opacities  - midodrine dc'd monitor BP  - lasix were previously dc'd due to low BP     #Hypothyroidism  - TSH 0.05  - repeat TSH in 4-6 weeks outpatient, currently pt has an infection     # Metabolic acidosis 2/2 Lactic acidosis  - Trend lactate  - Correct electrolytes as needed     # H/O Bell's Palsy  - Stable  - Steroid use since 7/26/21, PCP prophylaxis atovaquone 1500 ml     # Major Depressive Disorder  - Continue home meds    # DVT prophylaxis: Lovenox BID due to BMI>30  # GI prophylaxis: Protonix  # Diet: Regular  # Code status: Full code  # Disposition: ED3

## 2021-09-03 NOTE — DISCHARGE NOTE PROVIDER - CARE PROVIDER_API CALL
Myranda Morales  INTERNAL MEDICINE  [address] [place] [address]  Phone: [phone]  Fax: [phone]  Established Patient  Follow Up Time: 2 weeks

## 2021-09-03 NOTE — PROGRESS NOTE ADULT - SUBJECTIVE AND OBJECTIVE BOX
Patient is a 55y old  Female who presents with a chief complaint of SOB/Cough (02 Sep 2021 10:15)      HPI:  54 yo female w/ PMHx significant only for recent dx of [place] Palsy.   Diagnosed w/ COVID(+) on 8/14 after having SOB. Patient is unvaccinated.   Presented to ED for worsening SOB & Cough.    Denies chest pain, abdominal pain, N/V.     ED: Patient was tachypnic & tachycardic desating to 85% on RA>> HFNC 50/60 sating 92%  Labs: DDimer 348, , AG 23, Lactate 2.1;   CXR: Diffuse b/l opacities;  EKG: Sinus Tachy    Admit to MICU for monitoring    (22 Aug 2021 18:15)      PAST MEDICAL & SURGICAL HISTORY:      Home Medications:  buPROPion 150 mg/12 hours (SR) oral tablet, extended release: 1 tab(s) orally once a day (23 Aug 2021 09:13)  DULoxetine 60 mg oral delayed release capsule: 2 tab(s) orally once a day (23 Aug 2021 09:13)      .  Tobacco use:   EtOH use:  Illicit drug use:    Living situation:    Allergies:  codeine (Unknown)  erythromycin (Unknown)  sulfa drugs (Unknown)      FAMILY HISTORY:      Hospital Medications:  atovaquone  Suspension 1500 milliGRAM(s) Oral daily  buPROPion XL (24-Hour) . 150 milliGRAM(s) Oral daily  chlorhexidine 4% Liquid 1 Application(s) Topical <User Schedule>  DULoxetine 120 milliGRAM(s) Oral daily  enoxaparin Injectable 40 milliGRAM(s) SubCutaneous two times a day  pantoprazole    Tablet 40 milliGRAM(s) Oral before breakfast  polyethylene glycol 3350 17 Gram(s) Oral daily  senna 2 Tablet(s) Oral at bedtime  traZODone 50 milliGRAM(s) Oral at bedtime      Vital Signs Last 24 Hrs  T(C): 36.3 (03 Sep 2021 10:00), Max: 37.4 (02 Sep 2021 16:00)  T(F): 97.3 (03 Sep 2021 10:00), Max: 99.3 (02 Sep 2021 16:00)  HR: 94 (03 Sep 2021 10:00) (68 - 98)  BP: 114/58 (03 Sep 2021 10:00) (99/55 - 128/68)  BP(mean): 84 (02 Sep 2021 12:00) (84 - 84)  RR: 19 (03 Sep 2021 10:00) (18 - 19)  SpO2: 96% (03 Sep 2021 10:00) (94% - 99%)    I&O's Summary    02 Sep 2021 07:01  -  03 Sep 2021 07:00  --------------------------------------------------------  IN: 600 mL / OUT: 800 mL / NET: -200 mL        LABS:                        12.7   7.61  )-----------( 228      ( 03 Sep 2021 05:49 )             40.2       09-03    139  |  102  |  30<H>  ----------------------------<  84  4.6   |  30  |  0.7    Ca    9.1      03 Sep 2021 05:49    TPro  5.1<L>  /  Alb  3.3<L>  /  TBili  0.3  /  DBili  x   /  AST  23  /  ALT  51<H>  /  AlkPhos  50  09-03            PHYSICAL EXAM:  GENERAL: NAD, lying in bed comfortably on NC  HEAD: Atraumatic, Normocephalic  EYES: conjunctiva and sclera clear  NECK: Supple, No JVD  CHEST/LUNG: Crackles on auscultation on BL posterior lung fields   HEART: Regular rate and rhythm;   ABDOMEN: Bowel sounds present; Soft, Nontender,  EXTREMITIES: No clubbing, cyanosis, or edema  NERVOUS SYSTEM:  Alert & Oriented X3, speech clear. L sided bell's palsy/facial muscles paresis

## 2021-09-03 NOTE — DISCHARGE NOTE PROVIDER - NSDCCPCAREPLAN_GEN_ALL_CORE_FT
PRINCIPAL DISCHARGE DIAGNOSIS  Diagnosis: Pneumonia due to COVID-19 virus  Assessment and Plan of Treatment: Coronavirus disease 2019 (COVID-19) is a respiratory illness  that can spread from person to person. The virus that causes  COVID-19 is a novel coronavirus that was first identified during  an investigation into an outbreak in [place], [place].  The virus that causes COVID-19 probably emerged from an  animal source, but is now spreading from person to person.  The virus is thought to spread mainly between people who  are in close contact with one another (within about 6 feet)  through respiratory droplets produced when an infected  person coughs or sneezes. It also may be possible that a person  can get COVID-19 by touching a surface or object that has  the virus on it and then touching their own mouth, nose, or  possibly their eyes, but this is not thought to be the main  way the virus spreads.  Please stay home and avoid contact with others for at least a week after symptoms resolve and follow government guidelines.   Patients with COVID-19 have had mild to severe respiratory  illness with symptoms of  • fever  • cough  • shortness of breath  People can help protect themselves from respiratory illness with  everyday preventive actions.    • Avoid close contact with people who are sick.  • Avoid touching your eyes, nose, and mouth with  unwashed hands.  • Wash your hands often with soap and water for at least 20   seconds. Use an alcohol-based hand  that contains at  least 60% alcohol if soap and water are not available.   Stay home when you are sick.  • Cover your cough or sneeze with a tissue, then throw the  tissue in the trash.  • Clean and disinfect frequently touched objects  and surfaces.  Call 911 and inform them you are covid positive before you decide to go to the emergency room if you have chest pain, difficulty breathing, high fevers, worsening of your symptoms, feel unwell, or have nausea and vomiting.      SECONDARY DISCHARGE DIAGNOSES  Diagnosis: Hypothyroidism  Assessment and Plan of Treatment: Your thyroid hormone appears to be low, this can happen in acute infections as well. Please follow up with your primary care physician and repeat thyroid function tests in 4 to 6 weeks     PRINCIPAL DISCHARGE DIAGNOSIS  Diagnosis: Pneumonia due to COVID-19 virus  Assessment and Plan of Treatment: Coronavirus disease 2019 (COVID-19) is a respiratory illness  that can spread from person to person. The virus that causes  COVID-19 is a novel coronavirus that was first identified during  an investigation into an outbreak in [place], [place].  The virus that causes COVID-19 probably emerged from an  animal source, but is now spreading from person to person.  The virus is thought to spread mainly between people who  are in close contact with one another (within about 6 feet)  through respiratory droplets produced when an infected  person coughs or sneezes. It also may be possible that a person  can get COVID-19 by touching a surface or object that has  the virus on it and then touching their own mouth, nose, or  possibly their eyes, but this is not thought to be the main  way the virus spreads.  Please stay home and avoid contact with others for at least a week after symptoms resolve and follow government guidelines.   Patients with COVID-19 have had mild to severe respiratory  illness with symptoms of  • fever  • cough  • shortness of breath  People can help protect themselves from respiratory illness with  everyday preventive actions.    • Avoid close contact with people who are sick.  • Avoid touching your eyes, nose, and mouth with  unwashed hands.  • Wash your hands often with soap and water for at least 20   seconds. Use an alcohol-based hand  that contains at  least 60% alcohol if soap and water are not available.   Stay home when you are sick.  • Cover your cough or sneeze with a tissue, then throw the  tissue in the trash.  • Clean and disinfect frequently touched objects  and surfaces.  Call 911 and inform them you are covid positive before you decide to go to the emergency room if you have chest pain, difficulty breathing, high fevers, worsening of your symptoms, feel unwell, or have nausea and vomiting.      SECONDARY DISCHARGE DIAGNOSES  Diagnosis: Hypothyroidism  Assessment and Plan of Treatment: Your thyroid hormone appears to be low, this can happen in acute infections as well. Please follow up with your primary care physician and repeat thyroid function tests in 4 to 6 weeks    Diagnosis: Steroid-induced adrenal suppression  Assessment and Plan of Treatment: Take steroid taper as prescribed   f/u [facility] primary care physician in 2 weks

## 2021-09-03 NOTE — DISCHARGE NOTE PROVIDER - HOSPITAL COURSE
56 yo female w/ PMHx significant only for recent dx of [place] Palsy.   Diagnosed w/ COVID(+) on 8/14 after having SOB. Patient is unvaccinated.   Presented to ED for worsening SOB & Cough.    Admitted for COVID-19 PNA  managed as follows:   # Acute hypoxic respiratory failure  # Severe COVID PNA  - received1 dose tocilizumab 8/23/21  - received IV dexa which was tapered to prednisone    - Was on HFNC now on NC 4L   - LE doppler: no DVT  - Inflammatory markers were routinely checked   - CXR showed BL opacities  Pt also had low BP after being on lasix so was placed on midodrine briefly     #Hypothyroidism  - TSH 0.05  - needs to repeat TSH in 4-6 weeks outpatient, currently pt has an infection     # H/O Bell's Palsy  - Stabl  - Steroid use since 7/26/21, PCP prophylaxis atovaquone 1500 ml     # Major Depressive Disorder  - on home meds  Pt is now medically stable to be discharged home 56 yo female w/ PMHx significant only for recent dx of [place] Palsy.   Diagnosed w/ COVID(+) on 8/14 after having SOB. Patient is unvaccinated.   Presented to ED for worsening SOB & Cough.    Admitted for COVID-19 PNA  managed as follows:   # Acute hypoxic respiratory failure  # Severe COVID PNA  - received1 dose tocilizumab 8/23/21  - received IV dexa which was tapered to prednisone    - Was on HFNC now on NC 4L   - LE doppler: no DVT  - Inflammatory markers were routinely checked   - CXR showed BL opacities  Pt also had low BP after being on lasix so was placed on midodrine briefly     #Hypothyroidism  - TSH 0.05  - needs to repeat TSH in 4-6 weeks outpatient, currently pt has an infection     # H/O Bell's Palsy  - Stabl  - Steroid use since 7/26/21, PCP prophylaxis atovaquone 1500 ml     # Major Depressive Disorder  - on home meds  Pt is now medically stable to be discharged home

## 2021-09-04 ENCOUNTER — TRANSCRIPTION ENCOUNTER (OUTPATIENT)
Age: 56
End: 2021-09-04

## 2021-09-04 LAB — FUNGITELL: <31 PG/ML — SIGNIFICANT CHANGE UP

## 2021-09-04 PROCEDURE — 99233 SBSQ HOSP IP/OBS HIGH 50: CPT

## 2021-09-04 RX ORDER — TRAZODONE HCL 50 MG
1 TABLET ORAL
Qty: 0 | Refills: 0 | DISCHARGE
Start: 2021-09-04

## 2021-09-04 RX ORDER — ATOVAQUONE 750 MG/5ML
10 SUSPENSION ORAL
Qty: 250 | Refills: 0
Start: 2021-09-04 | End: 2021-09-28

## 2021-09-04 RX ORDER — INFLUENZA VIRUS VACCINE 15; 15; 15; 15 UG/.5ML; UG/.5ML; UG/.5ML; UG/.5ML
0.5 SUSPENSION INTRAMUSCULAR ONCE
Refills: 0 | Status: DISCONTINUED | OUTPATIENT
Start: 2021-09-04 | End: 2021-09-05

## 2021-09-04 RX ADMIN — SENNA PLUS 2 TABLET(S): 8.6 TABLET ORAL at 21:32

## 2021-09-04 RX ADMIN — ENOXAPARIN SODIUM 40 MILLIGRAM(S): 100 INJECTION SUBCUTANEOUS at 17:16

## 2021-09-04 RX ADMIN — DULOXETINE HYDROCHLORIDE 120 MILLIGRAM(S): 30 CAPSULE, DELAYED RELEASE ORAL at 11:39

## 2021-09-04 RX ADMIN — Medication 50 MILLIGRAM(S): at 22:13

## 2021-09-04 RX ADMIN — PANTOPRAZOLE SODIUM 40 MILLIGRAM(S): 20 TABLET, DELAYED RELEASE ORAL at 05:39

## 2021-09-04 RX ADMIN — ENOXAPARIN SODIUM 40 MILLIGRAM(S): 100 INJECTION SUBCUTANEOUS at 05:39

## 2021-09-04 RX ADMIN — Medication 40 MILLIGRAM(S): at 05:38

## 2021-09-04 RX ADMIN — POLYETHYLENE GLYCOL 3350 17 GRAM(S): 17 POWDER, FOR SOLUTION ORAL at 11:45

## 2021-09-04 RX ADMIN — CHLORHEXIDINE GLUCONATE 1 APPLICATION(S): 213 SOLUTION TOPICAL at 05:39

## 2021-09-04 RX ADMIN — BUPROPION HYDROCHLORIDE 150 MILLIGRAM(S): 150 TABLET, EXTENDED RELEASE ORAL at 11:39

## 2021-09-04 RX ADMIN — ATOVAQUONE 1500 MILLIGRAM(S): 750 SUSPENSION ORAL at 11:39

## 2021-09-04 NOTE — PROGRESS NOTE ADULT - SUBJECTIVE AND OBJECTIVE BOX
Patient is a 55y old  Female who presents with a chief complaint of SOB/Cough (02 Sep 2021 10:15)      HPI:  54 yo female w/ PMHx significant only for recent dx of [place] Palsy.   Diagnosed w/ COVID(+) on 8/14 after having SOB. Patient is unvaccinated.   Presented to ED for worsening SOB & Cough.    Denies chest pain, abdominal pain, N/V.     ED: Patient was tachypnic & tachycardic desating to 85% on RA>> HFNC 50/60 sating 92%  Labs: DDimer 348, , AG 23, Lactate 2.1;   CXR: Diffuse b/l opacities;  EKG: Sinus Tachy    Admit to MICU for monitoring    (22 Aug 2021 18:15)      PAST MEDICAL & SURGICAL HISTORY:      Home Medications:  buPROPion 150 mg/12 hours (SR) oral tablet, extended release: 1 tab(s) orally once a day (23 Aug 2021 09:13)  DULoxetine 60 mg oral delayed release capsule: 2 tab(s) orally once a day (23 Aug 2021 09:13)      .  Tobacco use:   EtOH use:  Illicit drug use:    Living situation:    Allergies:  codeine (Unknown)  erythromycin (Unknown)  sulfa drugs (Unknown)      FAMILY HISTORY:      Hospital Medications:  atovaquone  Suspension 1500 milliGRAM(s) Oral daily  buPROPion XL (24-Hour) . 150 milliGRAM(s) Oral daily  chlorhexidine 4% Liquid 1 Application(s) Topical <User Schedule>  DULoxetine 120 milliGRAM(s) Oral daily  enoxaparin Injectable 40 milliGRAM(s) SubCutaneous two times a day  pantoprazole    Tablet 40 milliGRAM(s) Oral before breakfast  polyethylene glycol 3350 17 Gram(s) Oral daily  senna 2 Tablet(s) Oral at bedtime  traZODone 50 milliGRAM(s) Oral at bedtime      Vital Signs Last 24 Hrs  T(C): 36.3 (03 Sep 2021 10:00), Max: 37.4 (02 Sep 2021 16:00)  T(F): 97.3 (03 Sep 2021 10:00), Max: 99.3 (02 Sep 2021 16:00)  HR: 94 (03 Sep 2021 10:00) (68 - 98)  BP: 114/58 (03 Sep 2021 10:00) (99/55 - 128/68)  BP(mean): 84 (02 Sep 2021 12:00) (84 - 84)  RR: 19 (03 Sep 2021 10:00) (18 - 19)  SpO2: 96% (03 Sep 2021 10:00) (94% - 99%)    I&O's Summary    02 Sep 2021 07:01  -  03 Sep 2021 07:00  --------------------------------------------------------  IN: 600 mL / OUT: 800 mL / NET: -200 mL        LABS:                        12.7   7.61  )-----------( 228      ( 03 Sep 2021 05:49 )             40.2       09-03    139  |  102  |  30<H>  ----------------------------<  84  4.6   |  30  |  0.7    Ca    9.1      03 Sep 2021 05:49    TPro  5.1<L>  /  Alb  3.3<L>  /  TBili  0.3  /  DBili  x   /  AST  23  /  ALT  51<H>  /  AlkPhos  50  09-03            PHYSICAL EXAM:  GENERAL: NAD, lying in bed comfortably on NC  HEAD: Atraumatic, Normocephalic  EYES: conjunctiva and sclera clear  NECK: Supple, No JVD  CHEST/LUNG: Crackles on auscultation on BL posterior lung fields   HEART: Regular rate and rhythm;   ABDOMEN: Bowel sounds present; Soft, Nontender,  EXTREMITIES: No clubbing, cyanosis, or edema  NERVOUS SYSTEM:  Alert & Oriented X3, speech clear. L sided bell's palsy/facial muscles paresis              Patient is a 55y old  Female who presents with a chief complaint of SOB/Cough (02 Sep 2021 10:15)      HPI:  56 yo female w/ PMHx significant only for recent dx of [place] Palsy.   Diagnosed w/ COVID(+) on 8/14 after having SOB. Patient is unvaccinated.   Presented to ED for worsening SOB & Cough.    Denies chest pain, abdominal pain, N/V.     ED: Patient was tachypnic & tachycardic desating to 85% on RA>> HFNC 50/60 sating 92%  Labs: DDimer 348, , AG 23, Lactate 2.1;   CXR: Diffuse b/l opacities;  EKG: Sinus Tachy    Admit to MICU for monitoring    (22 Aug 2021 18:15)      PAST MEDICAL & SURGICAL HISTORY:      Home Medications:  buPROPion 150 mg/12 hours (SR) oral tablet, extended release: 1 tab(s) orally once a day (23 Aug 2021 09:13)  DULoxetine 60 mg oral delayed release capsule: 2 tab(s) orally once a day (23 Aug 2021 09:13)      .  Tobacco use:   EtOH use:  Illicit drug use:    Living situation:    Allergies:  codeine (Unknown)  erythromycin (Unknown)  sulfa drugs (Unknown)      FAMILY HISTORY:      Hospital Medications:  atovaquone  Suspension 1500 milliGRAM(s) Oral daily  buPROPion XL (24-Hour) . 150 milliGRAM(s) Oral daily  chlorhexidine 4% Liquid 1 Application(s) Topical <User Schedule>  DULoxetine 120 milliGRAM(s) Oral daily  enoxaparin Injectable 40 milliGRAM(s) SubCutaneous two times a day  pantoprazole    Tablet 40 milliGRAM(s) Oral before breakfast  polyethylene glycol 3350 17 Gram(s) Oral daily  senna 2 Tablet(s) Oral at bedtime  traZODone 50 milliGRAM(s) Oral at bedtime      Vital Signs Last 24 Hrs  T(C): 36.3 (03 Sep 2021 10:00), Max: 37.4 (02 Sep 2021 16:00)  T(F): 97.3 (03 Sep 2021 10:00), Max: 99.3 (02 Sep 2021 16:00)  HR: 94 (03 Sep 2021 10:00) (68 - 98)  BP: 114/58 (03 Sep 2021 10:00) (99/55 - 128/68)  BP(mean): 84 (02 Sep 2021 12:00) (84 - 84)  RR: 19 (03 Sep 2021 10:00) (18 - 19)  SpO2: 96% (03 Sep 2021 10:00) (94% - 99%)    I&O's Summary    02 Sep 2021 07:01  -  03 Sep 2021 07:00  --------------------------------------------------------  IN: 600 mL / OUT: 800 mL / NET: -200 mL        LABS:                        12.7   7.61  )-----------( 228      ( 03 Sep 2021 05:49 )             40.2       09-03    139  |  102  |  30<H>  ----------------------------<  84  4.6   |  30  |  0.7    Ca    9.1      03 Sep 2021 05:49    TPro  5.1<L>  /  Alb  3.3<L>  /  TBili  0.3  /  DBili  x   /  AST  23  /  ALT  51<H>  /  AlkPhos  50  09-03            PHYSICAL EXAM:  GENERAL: NAD, lying in bed comfortably on NC  HEAD: Atraumatic, Normocephalic  EYES: conjunctiva and sclera clear  NECK: Supple, No JVD  CHEST/LUNG: Crackles on auscultation on BL posterior lung fields   HEART: Regular rate and rhythm;   ABDOMEN: Bowel sounds present; Soft, Nontender,  EXTREMITIES: No clubbing, cyanosis, or edema  NERVOUS SYSTEM:  Alert & Oriented X3, speech clear. L sided bell's palsy/facial muscles paresis

## 2021-09-04 NOTE — HOME OXYGEN EVALUATION NOTE - NSHOMEOXYEVAL_PHYATTNON-COVID_GEN_ALL_CORE
All acute illnesses and/or exacerbations have been treated and resolved; patient is in a chronic stable state. Alternative treatment methods have been tried and failed.

## 2021-09-04 NOTE — PROGRESS NOTE ADULT - ASSESSMENT
# Acute hypoxic respiratory failure  # Severe COVID PNA  - S/p 1 dose tocilizumab 8/23/21  - will taper steroids, starting with prednisone 40mg daily today  - Wean O2 as tolerated, satting 98% on NC 3L this am  - LE doppler: no DVT  - Inflammatory markers ferritin 470--> 421, procalcitonin 0.03--> 0.04, CRP 5 --> <3, d-dimer 288--> 154  - CXR shows BL opacities  - midodrine dc'd monitor BP  - lasix were previously dc'd due to low BP     #Hypothyroidism  - TSH 0.05  - repeat TSH in 4-6 weeks outpatient, currently pt has an infection     # Metabolic acidosis 2/2 Lactic acidosis  - Trend lactate  - Correct electrolytes as needed     # H/O Bell's Palsy  - Stable  - Steroid use since 7/26/21, PCP prophylaxis atovaquone 1500 ml     # Major Depressive Disorder  - Continue home meds    # DVT prophylaxis: Lovenox BID due to BMI>30  # GI prophylaxis: Protonix  # Diet: Regular  # Code status: Full code  # Disposition: Home, possibly with home O2   # Acute hypoxic respiratory failure  # Severe COVID PNA  - S/p 1 dose tocilizumab 8/23/21  - will taper steroids, starting with prednisone 40mg daily today  - Wean O2 as tolerated, satting 98% on NC 3L this am  - LE doppler: no DVT  - Inflammatory markers ferritin 470--> 421, procalcitonin 0.03--> 0.04, CRP 5 --> <3, d-dimer 288--> 154  - CXR shows BL opacities  - midodrine dc'd monitor BP  - lasix were previously dc'd due to low BP   - DC with home O2  - PT eval placed    # Stage 2 left sacral ulcer  - Barrier cream    #Hypothyroidism  - TSH 0.05  - repeat TSH in 4-6 weeks outpatient, currently pt has an infection     # Metabolic acidosis 2/2 Lactic acidosis  - Trend lactate  - Correct electrolytes as needed     # H/O Bell's Palsy  - Stable  - Steroid use since 7/26/21, PCP prophylaxis atovaquone 1500 ml     # Major Depressive Disorder  - Continue home meds    # DVT prophylaxis: Lovenox BID due to BMI>30  # GI prophylaxis: Protonix  # Diet: Regular  # Code status: Full code  # Disposition: Home, possibly with home O2   # Acute hypoxic respiratory failure  # Severe COVID PNA  - S/p 1 dose tocilizumab 8/23/21  - will taper steroids, starting with prednisone 40mg daily today  - Wean O2 as tolerated, satting 98% on NC 3L this am  - LE doppler: no DVT  - Inflammatory markers ferritin 470--> 421, procalcitonin 0.03--> 0.04, CRP 5 --> <3, d-dimer 288--> 154  - CXR shows BL opacities  - midodrine dc'd monitor BP  - lasix were previously dc'd due to low BP   - DC with home O2  - PT eval placed  - Will require long taper for steroids    # Stage 2 left sacral ulcer  - Barrier cream    #Hypothyroidism  - TSH 0.05  - repeat TSH in 4-6 weeks outpatient, currently pt has an infection     # Metabolic acidosis 2/2 Lactic acidosis  - Trend lactate  - Correct electrolytes as needed     # H/O Bell's Palsy  - Stable  - Steroid use since 7/26/21, PCP prophylaxis atovaquone 1500 ml     # Major Depressive Disorder  - Continue home meds    # DVT prophylaxis: Lovenox BID due to BMI>30  # GI prophylaxis: Protonix  # Diet: Regular  # Code status: Full code  # Disposition: Home, possibly with home O2

## 2021-09-04 NOTE — PROGRESS NOTE ADULT - REASON FOR ADMISSION
SOB/Cough

## 2021-09-04 NOTE — DISCHARGE NOTE NURSING/CASE MANAGEMENT/SOCIAL WORK - PATIENT PORTAL LINK FT
You can access the FollowMyHealth Patient Portal offered by [facility] by registering at the following website: [email or web address]. By joining [email or web address]’s FollowMyHealth portal, you will also be able to view your health information using other applications (apps) compatible with our system.

## 2021-09-04 NOTE — PROGRESS NOTE ADULT - ATTENDING COMMENTS
#Progress Note Handoff  Pending (specify):  COntinue to wean HFNC   Family discussion: house staff updated pt family  Disposition: ED3
Ms Lara is a 54 yo woman w/ medical history significant only for recent dx of [place] Palsy who presented with SOB.   COVID+  unvaccinated    #Acute hypoxic respiratory failure  #Severe COVID PNA  S/p 1 dose tocilizumab 8/23/21  c/w Decadron 6gIV q24  Wean O2 as tolerated, on NC 6L from HFNC 40/50  LE doppler: no DVT  Inflammatory markers ferritin 470, procalcitonin 0.03, CRP 5, d-dimer 288  CXR shows BL opacities  midodrine dc'd monitor BP  lasix were previously dc'd due to low BP     #Hypothyroidism  TSH 0.05  repeat TSH in 4-6 weeks outpatient, currently pt has an infection     #Metabolic acidosis 2/2 Lactic acidosis  Trend lactate  Correct electrolytes as needed     #H/O Bell's Palsy  Stable  Steroid use since 7/26/21, PCP prophylaxis atovaquone 1500 ml     #Major Depressive Disorder  c/w home meds    #DVT prophylaxis: Lovenox BID due to BMI>30  #GI prophylaxis: Protonix  # Code status: Full code      Progress Note Handoff:   Pending: home oxygen assessment, PT  Dispo: Transfer to 
Ms Lara is a 54 yo woman w/ medical history significant only for recent dx of [place] Palsy who presented with SOB.   COVID+  unvaccinated    #Acute hypoxic respiratory failure  #Severe COVID PNA  S/p 1 dose tocilizumab 8/23/21  c/w Decadron 6gIV q24  Wean O2 as tolerated, on NC 6L from HFNC 40/50  LE doppler: no DVT  Inflammatory markers ferritin 470, procalcitonin 0.03, CRP 5, d-dimer 288  CXR shows BL opacities  midodrine dc'd monitor BP  lasix were previously dc'd due to low BP     #Hypothyroidism  TSH 0.05  repeat TSH in 4-6 weeks outpatient, currently pt has an infection     #Metabolic acidosis 2/2 Lactic acidosis  Trend lactate  Correct electrolytes as needed     #H/O Bell's Palsy  Stable  Steroid use since 7/26/21, PCP prophylaxis atovaquone 1500 ml     #Major Depressive Disorder  c/w home meds    #DVT prophylaxis: Lovenox BID due to BMI>30  #GI prophylaxis: Protonix  # Code status: Full code      Progress Note Handoff:   Pending: clinical improvement   Dispo: acute .
Ms Lara is a 56 yo woman w/ medical history significant only for recent dx of [place] Palsy who presented with SOB.   COVID+  unvaccinated    #Acute hypoxic respiratory failure  #Severe COVID PNA  S/p 1 dose tocilizumab 8/23/21  c/w Decadron 6gIV q24  Wean O2 as tolerated, on HFNC 40/50  LE doppler: no DVT  Inflammatory markers ferritin 470, procalcitonin 0.03, CRP 5, d-dimer 288  CXR shows BL opacities  midodrine dc'd monitor BP  lasix were previously dc'd due to low BP     #Hypothyroidism  TSH 0.05  repeat TSH in 4-6 weeks outpatient, currently pt has an infection     #Metabolic acidosis 2/2 Lactic acidosis  Trend lactate  Correct electrolytes as needed     #H/O Bell's Palsy  Stable  Steroid use since 7/26/21, PCP prophylaxis atovaquone 1500 ml     #Major Depressive Disorder  c/w home meds    #DVT prophylaxis: Lovenox BID due to BMI>30  #GI prophylaxis: Protonix  # Code status: Full code      Progress Note Handoff:   Pending: clinical improvement   Dispo: acute
Ms Lara is a 54 yo woman w/ medical history significant only for recent dx of [place] Palsy who presented with SOB.   COVID+  unvaccinated    #Acute hypoxic respiratory failure  #Severe COVID PNA  S/p 1 dose tocilizumab 8/23/21  Wean O2 as tolerated, on NC 6L from HFNC 40/50  LE doppler: no DVT  Inflammatory markers ferritin 470, procalcitonin 0.03, CRP 5, d-dimer 288  CXR shows BL opacities  midodrine dc'd monitor BP  lasix were previously dc'd due to low BP   c/w Decadron 6gIV q24 - will need a taper on discharge    #Hypothyroidism  TSH 0.05  repeat TSH in 4-6 weeks outpatient, currently pt has an infection     #Metabolic acidosis 2/2 Lactic acidosis  Trend lactate  Correct electrolytes as needed     #H/O Bell's Palsy  Stable  Steroid use since 7/26/21, PCP prophylaxis atovaquone 1500 ml     #Major Depressive Disorder  c/w home meds    DVT prophylaxis: Lovenox BID due to BMI>30  GI prophylaxis: Protonix  Code status: Full code      Progress Note Handoff:   Pending: home oxygen assessment, PT  Dispo: home w/ home care services and oxygen

## 2021-09-05 VITALS
TEMPERATURE: 99 F | SYSTOLIC BLOOD PRESSURE: 123 MMHG | RESPIRATION RATE: 20 BRPM | DIASTOLIC BLOOD PRESSURE: 71 MMHG | HEART RATE: 88 BPM

## 2021-09-05 LAB
ALBUMIN SERPL ELPH-MCNC: 3.1 G/DL — LOW (ref 3.5–5.2)
ALP SERPL-CCNC: 48 U/L — SIGNIFICANT CHANGE UP (ref 30–115)
ALT FLD-CCNC: 68 U/L — HIGH (ref 0–41)
ANION GAP SERPL CALC-SCNC: 13 MMOL/L — SIGNIFICANT CHANGE UP (ref 7–14)
AST SERPL-CCNC: 48 U/L — HIGH (ref 0–41)
BASOPHILS # BLD AUTO: 0.05 K/UL — SIGNIFICANT CHANGE UP (ref 0–0.2)
BASOPHILS NFR BLD AUTO: 0.8 % — SIGNIFICANT CHANGE UP (ref 0–1)
BILIRUB SERPL-MCNC: 0.4 MG/DL — SIGNIFICANT CHANGE UP (ref 0.2–1.2)
BUN SERPL-MCNC: 28 MG/DL — HIGH (ref 10–20)
CALCIUM SERPL-MCNC: 9.1 MG/DL — SIGNIFICANT CHANGE UP (ref 8.5–10.1)
CHLORIDE SERPL-SCNC: 103 MMOL/L — SIGNIFICANT CHANGE UP (ref 98–110)
CO2 SERPL-SCNC: 21 MMOL/L — SIGNIFICANT CHANGE UP (ref 17–32)
CREAT SERPL-MCNC: 0.6 MG/DL — LOW (ref 0.7–1.5)
EOSINOPHIL # BLD AUTO: 0.29 K/UL — SIGNIFICANT CHANGE UP (ref 0–0.7)
EOSINOPHIL NFR BLD AUTO: 4.5 % — SIGNIFICANT CHANGE UP (ref 0–8)
GLUCOSE SERPL-MCNC: 86 MG/DL — SIGNIFICANT CHANGE UP (ref 70–99)
HCT VFR BLD CALC: 39.4 % — SIGNIFICANT CHANGE UP (ref 37–47)
HGB BLD-MCNC: 12.7 G/DL — SIGNIFICANT CHANGE UP (ref 12–16)
IMM GRANULOCYTES NFR BLD AUTO: 3.1 % — HIGH (ref 0.1–0.3)
LYMPHOCYTES # BLD AUTO: 1.36 K/UL — SIGNIFICANT CHANGE UP (ref 1.2–3.4)
LYMPHOCYTES # BLD AUTO: 21 % — SIGNIFICANT CHANGE UP (ref 20.5–51.1)
MCHC RBC-ENTMCNC: 27.7 PG — SIGNIFICANT CHANGE UP (ref 27–31)
MCHC RBC-ENTMCNC: 32.2 G/DL — SIGNIFICANT CHANGE UP (ref 32–37)
MCV RBC AUTO: 86 FL — SIGNIFICANT CHANGE UP (ref 81–99)
MONOCYTES # BLD AUTO: 0.51 K/UL — SIGNIFICANT CHANGE UP (ref 0.1–0.6)
MONOCYTES NFR BLD AUTO: 7.9 % — SIGNIFICANT CHANGE UP (ref 1.7–9.3)
NEUTROPHILS # BLD AUTO: 4.06 K/UL — SIGNIFICANT CHANGE UP (ref 1.4–6.5)
NEUTROPHILS NFR BLD AUTO: 62.7 % — SIGNIFICANT CHANGE UP (ref 42.2–75.2)
NRBC # BLD: 0 /100 WBCS — SIGNIFICANT CHANGE UP (ref 0–0)
PLATELET # BLD AUTO: 144 K/UL — SIGNIFICANT CHANGE UP (ref 130–400)
POTASSIUM SERPL-MCNC: 5.1 MMOL/L — HIGH (ref 3.5–5)
POTASSIUM SERPL-SCNC: 5.1 MMOL/L — HIGH (ref 3.5–5)
PROT SERPL-MCNC: 5.3 G/DL — LOW (ref 6–8)
RBC # BLD: 4.58 M/UL — SIGNIFICANT CHANGE UP (ref 4.2–5.4)
RBC # FLD: 15.7 % — HIGH (ref 11.5–14.5)
SODIUM SERPL-SCNC: 137 MMOL/L — SIGNIFICANT CHANGE UP (ref 135–146)
WBC # BLD: 6.47 K/UL — SIGNIFICANT CHANGE UP (ref 4.8–10.8)
WBC # FLD AUTO: 6.47 K/UL — SIGNIFICANT CHANGE UP (ref 4.8–10.8)

## 2021-09-05 PROCEDURE — 99239 HOSP IP/OBS DSCHRG MGMT >30: CPT

## 2021-09-05 RX ADMIN — POLYETHYLENE GLYCOL 3350 17 GRAM(S): 17 POWDER, FOR SOLUTION ORAL at 11:24

## 2021-09-05 RX ADMIN — CHLORHEXIDINE GLUCONATE 1 APPLICATION(S): 213 SOLUTION TOPICAL at 05:23

## 2021-09-05 RX ADMIN — ENOXAPARIN SODIUM 40 MILLIGRAM(S): 100 INJECTION SUBCUTANEOUS at 17:22

## 2021-09-05 RX ADMIN — PANTOPRAZOLE SODIUM 40 MILLIGRAM(S): 20 TABLET, DELAYED RELEASE ORAL at 05:22

## 2021-09-05 RX ADMIN — Medication 40 MILLIGRAM(S): at 05:22

## 2021-09-05 RX ADMIN — ENOXAPARIN SODIUM 40 MILLIGRAM(S): 100 INJECTION SUBCUTANEOUS at 05:22

## 2021-09-05 RX ADMIN — DULOXETINE HYDROCHLORIDE 120 MILLIGRAM(S): 30 CAPSULE, DELAYED RELEASE ORAL at 11:48

## 2021-09-05 RX ADMIN — ATOVAQUONE 1500 MILLIGRAM(S): 750 SUSPENSION ORAL at 11:23

## 2021-09-05 RX ADMIN — BUPROPION HYDROCHLORIDE 150 MILLIGRAM(S): 150 TABLET, EXTENDED RELEASE ORAL at 11:47

## 2021-09-05 NOTE — CHART NOTE - NSCHARTNOTEFT_GEN_A_CORE
Pt was seen and examined at the bedside.  Reports feeling tired but otherwise appears comfortable.  No other complaints at this time.    Pt to be discharged with oxygen therapy and home PT.  Pt will need steroid taper due to concern for Hypothalamic Pituitary Adrenal Suppression secondary to long term steroid use.  Pt will also need to continue Atovaquone for PCP ppx.  Prescription sent to patient's pharmacy. Pt to f/u with primary within 2 weeks.

## 2021-09-08 ENCOUNTER — APPOINTMENT (OUTPATIENT)
Dept: OTOLARYNGOLOGY | Facility: CLINIC | Age: 56
End: 2021-09-08

## 2021-09-10 DIAGNOSIS — E87.2 ACIDOSIS: ICD-10-CM

## 2021-09-10 DIAGNOSIS — J96.01 ACUTE RESPIRATORY FAILURE WITH HYPOXIA: ICD-10-CM

## 2021-09-10 DIAGNOSIS — E87.1 HYPO-OSMOLALITY AND HYPONATREMIA: ICD-10-CM

## 2021-09-10 DIAGNOSIS — R06.02 SHORTNESS OF BREATH: ICD-10-CM

## 2021-09-10 DIAGNOSIS — U07.1 COVID-19: ICD-10-CM

## 2021-09-10 DIAGNOSIS — Z88.2 ALLERGY STATUS TO SULFONAMIDES: ICD-10-CM

## 2021-09-10 DIAGNOSIS — J12.82 PNEUMONIA DUE TO CORONAVIRUS DISEASE 2019: ICD-10-CM

## 2021-09-10 DIAGNOSIS — F32.9 MAJOR DEPRESSIVE DISORDER, SINGLE EPISODE, UNSPECIFIED: ICD-10-CM

## 2021-09-10 DIAGNOSIS — E03.9 HYPOTHYROIDISM, UNSPECIFIED: ICD-10-CM

## 2021-11-01 ENCOUNTER — APPOINTMENT (OUTPATIENT)
Dept: OTOLARYNGOLOGY | Facility: CLINIC | Age: 56
End: 2021-11-01
Payer: MEDICAID

## 2021-11-01 DIAGNOSIS — R26.89 OTHER ABNORMALITIES OF GAIT AND MOBILITY: ICD-10-CM

## 2021-11-01 PROCEDURE — 92550 TYMPANOMETRY & REFLEX THRESH: CPT

## 2021-11-01 PROCEDURE — 99214 OFFICE O/P EST MOD 30 MIN: CPT | Mod: 25

## 2021-11-01 PROCEDURE — 92557 COMPREHENSIVE HEARING TEST: CPT

## 2021-11-01 NOTE — PHYSICAL EXAM
[Normal] : mucosa is normal [Midline] : trachea located in midline position [] : [place]-Hallpike test is negative

## 2021-11-01 NOTE — ASSESSMENT
[FreeTextEntry1] : Patient had a sudden onset of hearing loss and bell's palsy back in july 2021.\par Still feels fluctuations in hearing.\par \par Had contracted covid pneumonia and was in the hospital.\par \par I personally reviewed, interpreted and discussed patient's MRI images. in favor of Bell's palsy.\par \par I reviewed, interpreted, and discussed the Audiogram done today. Compared to previous test. \par

## 2021-11-01 NOTE — HISTORY OF PRESENT ILLNESS
[de-identified] : Patient presents today with c/o left otalgia, nausea, bells palsy.. Patient states about 3 weeks ago started having ear pain. Seen in the UC; and sent to the ER due to left sided facial droop. Patient admits having blisters in the left ear. Patient seen in the ER to rule out stroke. Put on Valacyclovir by PCP and prednisone 60mg for 1 week, by ED, completed last week. Patient admits being bit by a tick sometime in June 2021. Patient also admits having water balloon pop in her left ear the end of June. Also needed to have tooth extracted- done Friday 7/23/21. Currently patient continues to have left facial droop. Jaw pain. Left otalgia. Muffled hearing loss. Sharp pains in the ear at times. Eye twitching at times. Unstable gait.  [FreeTextEntry1] : \par 11/1/21: Patient presents today following up on Bell's palsy. Patient admits on and off clicking in the left ear. Jaw clicking at times. Continues to have left eye tearing. Some pulling in the mouth muscles. Patient had MRI from 8/2021. Unbalanced sensation from time to time.

## 2021-11-02 ENCOUNTER — RESULT REVIEW (OUTPATIENT)
Age: 56
End: 2021-11-02

## 2021-11-18 ENCOUNTER — OUTPATIENT (OUTPATIENT)
Dept: OUTPATIENT SERVICES | Facility: HOSPITAL | Age: 56
LOS: 1 days | Discharge: HOME | End: 2021-11-18
Payer: MEDICAID

## 2021-11-18 DIAGNOSIS — R26.89 OTHER ABNORMALITIES OF GAIT AND MOBILITY: ICD-10-CM

## 2021-11-18 PROCEDURE — 70553 MRI BRAIN STEM W/O & W/DYE: CPT | Mod: 26

## 2021-12-01 ENCOUNTER — OUTPATIENT (OUTPATIENT)
Dept: OUTPATIENT SERVICES | Facility: HOSPITAL | Age: 56
LOS: 1 days | Discharge: HOME | End: 2021-12-01

## 2021-12-01 DIAGNOSIS — R26.0 ATAXIC GAIT: ICD-10-CM

## 2021-12-01 DIAGNOSIS — G51.0 BELL'S PALSY: ICD-10-CM

## 2021-12-13 ENCOUNTER — APPOINTMENT (OUTPATIENT)
Dept: OTOLARYNGOLOGY | Facility: CLINIC | Age: 56
End: 2021-12-13
Payer: MEDICAID

## 2021-12-13 PROCEDURE — 99214 OFFICE O/P EST MOD 30 MIN: CPT

## 2021-12-13 NOTE — HISTORY OF PRESENT ILLNESS
[de-identified] : Patient presents today with c/o left otalgia, nausea, bells palsy.. Patient states about 3 weeks ago started having ear pain. Seen in the UC; and sent to the ER due to left sided facial droop. Patient admits having blisters in the left ear. Patient seen in the ER to rule out stroke. Put on Valacyclovir by PCP and prednisone 60mg for 1 week, by ED, completed last week. Patient admits being bit by a tick sometime in June 2021. Patient also admits having water balloon pop in her left ear the end of June. Also needed to have tooth extracted- done Friday 7/23/21. Currently patient continues to have left facial droop. Jaw pain. Left otalgia. Muffled hearing loss. Sharp pains in the ear at times. Eye twitching at times. Unstable gait. \par \par \par 11/1/21: Patient presents today following up on Bell's palsy. Patient admits on and off clicking in the left ear. Jaw clicking at times. Continues to have left eye tearing. Some pulling in the mouth muscles. Patient had MRI from 8/2021. Unbalanced sensation from time to time.  [FreeTextEntry1] : 12/13/21: Patient following up on [place] palsy, facial nerve palsy. Patient had MRI and saw speech.

## 2021-12-13 NOTE — ASSESSMENT
[FreeTextEntry1] : I personally reviewed, interpreted and discussed patient's MRI images. Inflate CN7.\par \par Patient complaining of snoring and daytime sleepiness. will send for PSG.\par \par continue vestibular regab.

## 2022-01-11 ENCOUNTER — OUTPATIENT (OUTPATIENT)
Dept: OUTPATIENT SERVICES | Facility: HOSPITAL | Age: 57
LOS: 1 days | Discharge: HOME | End: 2022-01-11
Payer: MEDICAID

## 2022-01-11 PROCEDURE — 95810 POLYSOM 6/> YRS 4/> PARAM: CPT | Mod: 26

## 2022-01-12 DIAGNOSIS — G47.33 OBSTRUCTIVE SLEEP APNEA (ADULT) (PEDIATRIC): ICD-10-CM

## 2022-02-25 ENCOUNTER — APPOINTMENT (OUTPATIENT)
Dept: OTOLARYNGOLOGY | Facility: CLINIC | Age: 57
End: 2022-02-25

## 2022-11-01 NOTE — ED ADULT NURSE NOTE - BREATHING, MLM
Spontaneous, unlabored and symmetrical Azithromycin Counseling:  I discussed with the patient the risks of azithromycin including but not limited to GI upset, allergic reaction, drug rash, diarrhea, and yeast infections.

## 2023-01-13 ENCOUNTER — APPOINTMENT (OUTPATIENT)
Dept: OTOLARYNGOLOGY | Facility: CLINIC | Age: 58
End: 2023-01-13
Payer: MEDICAID

## 2023-01-13 DIAGNOSIS — G51.0 BELL'S PALSY: ICD-10-CM

## 2023-01-13 DIAGNOSIS — R06.83 SNORING: ICD-10-CM

## 2023-01-13 PROCEDURE — 99214 OFFICE O/P EST MOD 30 MIN: CPT

## 2023-01-13 NOTE — HISTORY OF PRESENT ILLNESS
[FreeTextEntry1] : Patient following up today on snoring , sleep study .  Patient still snores a lot.  She had a sleep study results shree on 01/23/2022. Still gets twitching in left eye during chewing.

## 2023-01-13 NOTE — ASSESSMENT
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 \par \par Patient to get the CPAP.

## 2023-02-24 ENCOUNTER — APPOINTMENT (OUTPATIENT)
Dept: PULMONOLOGY | Facility: CLINIC | Age: 58
End: 2023-02-24
Payer: MEDICAID

## 2023-02-24 VITALS
HEIGHT: 62 IN | SYSTOLIC BLOOD PRESSURE: 126 MMHG | WEIGHT: 195 LBS | RESPIRATION RATE: 14 BRPM | HEART RATE: 96 BPM | DIASTOLIC BLOOD PRESSURE: 82 MMHG | OXYGEN SATURATION: 98 % | BODY MASS INDEX: 35.88 KG/M2

## 2023-02-24 PROCEDURE — 99203 OFFICE O/P NEW LOW 30 MIN: CPT | Mod: 25

## 2023-02-24 PROCEDURE — 71046 X-RAY EXAM CHEST 2 VIEWS: CPT

## 2023-02-24 PROCEDURE — 99213 OFFICE O/P EST LOW 20 MIN: CPT | Mod: 25

## 2023-02-24 NOTE — HISTORY OF PRESENT ILLNESS
[Initial Evaluation] : an initial evaluation of [Excessive Daytime Sleepiness] : excessive daytime sleepiness [Snoring] : snoring [Unrefreshing Sleep] : unrefreshing sleep [Currently Experiencing] : The patient is currently experiencing symptoms. [None] : No associated symptoms are reported [Good Sleep Hygiene] : good sleep hygiene

## 2023-02-24 NOTE — ASSESSMENT
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  Here for evaluation \par SP Therapy for severe COVID 19 pneumonia

## 2023-02-24 NOTE — PROCEDURE
[FreeTextEntry1] : CXR PA and Lateral \par The costophrenic and cardiophrenic angles are sharp\par The jeannie parenchyma shows no infiltrates, consolidations, or nodules \par The Mediastinum is within normal limits\par No pleural effusions\par

## 2023-04-13 ENCOUNTER — APPOINTMENT (OUTPATIENT)
Dept: SLEEP CENTER | Facility: HOSPITAL | Age: 58
End: 2023-04-13
Payer: MEDICAID

## 2023-04-13 ENCOUNTER — OUTPATIENT (OUTPATIENT)
Dept: OUTPATIENT SERVICES | Facility: HOSPITAL | Age: 58
LOS: 1 days | Discharge: HOME | End: 2023-04-13
Payer: MEDICAID

## 2023-04-13 DIAGNOSIS — G47.33 OBSTRUCTIVE SLEEP APNEA (ADULT) (PEDIATRIC): ICD-10-CM

## 2023-04-13 PROCEDURE — 95811 POLYSOM 6/>YRS CPAP 4/> PARM: CPT | Mod: 26

## 2023-04-13 PROCEDURE — 95811 POLYSOM 6/>YRS CPAP 4/> PARM: CPT

## 2023-04-15 DIAGNOSIS — G47.33 OBSTRUCTIVE SLEEP APNEA (ADULT) (PEDIATRIC): ICD-10-CM

## 2023-05-01 ENCOUNTER — APPOINTMENT (OUTPATIENT)
Dept: PULMONOLOGY | Facility: CLINIC | Age: 58
End: 2023-05-01
Payer: MEDICAID

## 2023-05-01 VITALS
BODY MASS INDEX: 37.36 KG/M2 | HEIGHT: 62 IN | WEIGHT: 203 LBS | SYSTOLIC BLOOD PRESSURE: 110 MMHG | RESPIRATION RATE: 14 BRPM | DIASTOLIC BLOOD PRESSURE: 86 MMHG | HEART RATE: 104 BPM | OXYGEN SATURATION: 91 %

## 2023-05-01 PROCEDURE — 99214 OFFICE O/P EST MOD 30 MIN: CPT

## 2023-05-01 RX ORDER — PREDNISONE 20 MG/1
20 TABLET ORAL
Qty: 10 | Refills: 0 | Status: ACTIVE | COMMUNITY
Start: 2023-05-01 | End: 1900-01-01

## 2023-05-01 RX ORDER — FLUTICASONE FUROATE 200 UG/1
200 POWDER RESPIRATORY (INHALATION) DAILY
Qty: 1 | Refills: 4 | Status: ACTIVE | COMMUNITY
Start: 2023-05-01 | End: 1900-01-01

## 2023-05-01 NOTE — HISTORY OF PRESENT ILLNESS
[Cough Variant] : cough variant [Doing Well] : doing well [Well Controlled] : Well controlled [Cough] : worsened coughing [Chest Tightness Or Heavy Pressure] : worsened chest tightness [Checks Regularly] : The patient checks ~his/her~ peak flow regularly [Good Control] : peak flow has been good [Goals--Doing Well] : the patient is doing well with ~his/her~ asthma goals [Initial Evaluation] : an initial evaluation of [Excessive Daytime Sleepiness] : excessive daytime sleepiness [Snoring] : snoring [Unrefreshing Sleep] : unrefreshing sleep [Currently Experiencing] : The patient is currently experiencing symptoms. [None] : No associated symptoms are reported [Good Sleep Hygiene] : good sleep hygiene

## 2023-05-01 NOTE — ASSESSMENT
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  Here for evaluation \par NPSG with severe PRAVIN Optimum CPAP 7 cm H2O\par Cough variant asthma with mild exacerbation \par SP Therapy for severe COVID 19 pneumonia

## 2023-07-13 ENCOUNTER — APPOINTMENT (OUTPATIENT)
Dept: PULMONOLOGY | Facility: HOSPITAL | Age: 58
End: 2023-07-13
Payer: MEDICAID

## 2023-07-13 ENCOUNTER — OUTPATIENT (OUTPATIENT)
Dept: OUTPATIENT SERVICES | Facility: HOSPITAL | Age: 58
LOS: 1 days | End: 2023-07-13
Payer: MEDICAID

## 2023-07-13 DIAGNOSIS — R06.02 SHORTNESS OF BREATH: ICD-10-CM

## 2023-07-13 PROCEDURE — 94729 DIFFUSING CAPACITY: CPT | Mod: 26

## 2023-07-13 PROCEDURE — 94726 PLETHYSMOGRAPHY LUNG VOLUMES: CPT

## 2023-07-13 PROCEDURE — 94727 GAS DIL/WSHOT DETER LNG VOL: CPT | Mod: 26

## 2023-07-13 PROCEDURE — 94060 EVALUATION OF WHEEZING: CPT | Mod: 26

## 2023-07-13 PROCEDURE — 94070 EVALUATION OF WHEEZING: CPT

## 2023-07-13 PROCEDURE — 94729 DIFFUSING CAPACITY: CPT

## 2023-07-14 DIAGNOSIS — R06.02 SHORTNESS OF BREATH: ICD-10-CM

## 2023-08-02 ENCOUNTER — APPOINTMENT (OUTPATIENT)
Dept: PULMONOLOGY | Facility: CLINIC | Age: 58
End: 2023-08-02
Payer: MEDICAID

## 2023-08-02 VITALS
RESPIRATION RATE: 14 BRPM | OXYGEN SATURATION: 96 % | DIASTOLIC BLOOD PRESSURE: 70 MMHG | SYSTOLIC BLOOD PRESSURE: 110 MMHG | WEIGHT: 210 LBS | HEART RATE: 95 BPM | BODY MASS INDEX: 38.64 KG/M2 | HEIGHT: 62 IN

## 2023-08-02 PROCEDURE — 99214 OFFICE O/P EST MOD 30 MIN: CPT

## 2023-08-02 NOTE — HISTORY OF PRESENT ILLNESS
[Cough Variant] : cough variant [Doing Well] : doing well [Well Controlled] : Well controlled [Cough] : resolved coughing [Chest Tightness Or Heavy Pressure] : resolved chest tightness [Checks Regularly] : The patient checks ~his/her~ peak flow regularly [Good Control] : peak flow has been good [Adherent] : the patient is adherent with ~his/her~ medication regimen [Goals--Doing Well] : the patient is doing well with ~his/her~ asthma goals [Follow-Up - Routine Clinic] : a routine clinic follow-up of [None] : No associated symptoms are reported [Good Sleep Hygiene] : good sleep hygiene [Good Compliance] : good compliance with treatment [Good Tolerance] : good tolerance of treatment [Good Symptom Control] : good symptom control [de-identified] : APAP

## 2023-08-02 NOTE — ASSESSMENT
[FreeTextEntry1] : Severe PRAVIN on APAP 4-12  Cough variant asthma well compensated   SP Therapy for severe COVID 19 pneumonia.

## 2023-10-17 NOTE — DISCHARGE NOTE NURSING/CASE MANAGEMENT/SOCIAL WORK - NSDCPEPT PROEDMA_GEN_ALL_CORE
Spiritual Care Visit    Clinical Encounter Type  Visited With: Patient and family together  Routine Visit: Introduction  Continue Visiting: Yes              Sacramental Encounters  Sacrament of Sick-Anointing: Anointed    Patient received the Sacrament of the Anointing of the Sick by Fr. Armando Aguilar,  [employer] .    Family members were preset.      Within the [employer] the Sacrament of the Sick, also known as the Anointing of the Sick, is a prayer in which we invoke the healing power of God.  Along with Eucharist and Reconciliation it is a repeatable sacrament and should be received by anyone about to undergo surgery, those in recovery and the elderly.  This IS NOT 'Last Rites' nor does the Muslim have such a ritual.  Those who are actively dying should receive the Anointing of the Sick for physical and spiritual healing.                                 Yes

## 2023-11-28 ENCOUNTER — EMERGENCY (EMERGENCY)
Facility: HOSPITAL | Age: 58
LOS: 0 days | Discharge: HOME | End: 2023-11-28
Attending: EMERGENCY MEDICINE
Payer: MEDICAID

## 2023-11-28 VITALS
SYSTOLIC BLOOD PRESSURE: 130 MMHG | HEIGHT: 62 IN | WEIGHT: 210.1 LBS | TEMPERATURE: 98 F | RESPIRATION RATE: 18 BRPM | DIASTOLIC BLOOD PRESSURE: 64 MMHG | HEART RATE: 102 BPM | OXYGEN SATURATION: 98 %

## 2023-11-28 DIAGNOSIS — K08.89 OTHER SPECIFIED DISORDERS OF TEETH AND SUPPORTING STRUCTURES: ICD-10-CM

## 2023-11-28 DIAGNOSIS — Z86.39 PERSONAL HISTORY OF OTHER ENDOCRINE, NUTRITIONAL AND METABOLIC DISEASE: ICD-10-CM

## 2023-11-28 DIAGNOSIS — Z88.5 ALLERGY STATUS TO NARCOTIC AGENT: ICD-10-CM

## 2023-11-28 DIAGNOSIS — Z88.2 ALLERGY STATUS TO SULFONAMIDES: ICD-10-CM

## 2023-11-28 DIAGNOSIS — K02.9 DENTAL CARIES, UNSPECIFIED: ICD-10-CM

## 2023-11-28 DIAGNOSIS — Z88.1 ALLERGY STATUS TO OTHER ANTIBIOTIC AGENTS STATUS: ICD-10-CM

## 2023-11-28 PROCEDURE — 99283 EMERGENCY DEPT VISIT LOW MDM: CPT

## 2023-11-28 RX ORDER — AMOXICILLIN 250 MG/5ML
1 SUSPENSION, RECONSTITUTED, ORAL (ML) ORAL
Qty: 21 | Refills: 0
Start: 2023-11-28 | End: 2023-12-04

## 2023-11-28 RX ORDER — IBUPROFEN 200 MG
600 TABLET ORAL ONCE
Refills: 0 | Status: COMPLETED | OUTPATIENT
Start: 2023-11-28 | End: 2023-11-28

## 2023-11-28 RX ADMIN — Medication 600 MILLIGRAM(S): at 15:25

## 2023-11-28 NOTE — ED PROVIDER NOTE - PHYSICAL EXAMINATION
CONSTITUTIONAL: Well-developed; well-nourished; NAD  SKIN: warm, dry, w/o rash  HEAD: NCAT  EYES: PERRLA, EOMI, no conjunctival injection  ENT: No nasal discharge; nl OP without erythema or exudates, Multiple caries of tooth 2, 5, 29, 30, without erythema of the gum, visible abscess, palpable abscess, facial swelling  NECK: Supple, non-tender  CARD: nl S1, S2; RRR, no MRG, no JVD  RESP: CTAB, normal respiratory effort  ABD: BS+, soft, NTND, no HSM  EXT: Normal ROM.  No clubbing, cyanosis or edema  NEURO: Alert, oriented, grossly unremarkable  PSYCH: Cooperative, appropriate

## 2023-11-28 NOTE — ED PROVIDER NOTE - NSFOLLOWUPCLINICS_GEN_ALL_ED_FT
[facility]  Dental  [address] [place] [id number]  Phone: [phone]  Fax:   Follow Up Time: 1-3 Days

## 2023-11-28 NOTE — ED PROVIDER NOTE - OBJECTIVE STATEMENT
58-year-old female with past medical history of hypothyroidism presenting for dental pain.  Pain has been progressively worsening over the past 3 days, endorses sensitivity to food.  Denies fever, chills, facial swelling.  Denies systemic signs of infection.  Patient otherwise well

## 2023-11-28 NOTE — ED PROVIDER NOTE - PATIENT PORTAL LINK FT
You can access the FollowMyHealth Patient Portal offered by [facility] by registering at the following website: [email or web address]. By joining [employer]’s FollowMyHealth portal, you will also be able to view your health information using other applications (apps) compatible with our system.

## 2023-11-28 NOTE — ED ADULT NURSE NOTE - NSFALLRISKASMTTYPE_ED_ALL_ED
"Requested Prescriptions   Pending Prescriptions Disp Refills     metoprolol tartrate (LOPRESSOR) 100 MG tablet [Pharmacy Med Name:  Last Written Prescription Date:  4/26/2019  Last Fill Quantity: 180,  # refills: 2   Last office visit: 11/26/2019 with prescribing provider:     Future Office Visit:   METOPROLOL TARTRATE 100  TAB] 180 tablet 2     Sig: TAKE 1 TABLET (100MG) BY MOUTH 2 TIMES DAILY       Beta-Blockers Protocol Passed - 1/23/2020  9:35 AM        Passed - Blood pressure under 140/90 in past 12 months     BP Readings from Last 3 Encounters:   12/11/19 120/64   11/26/19 138/78   11/14/19 122/83                 Passed - Patient is age 6 or older        Passed - Recent (12 mo) or future (30 days) visit within the authorizing provider's specialty     Patient has had an office visit with the authorizing provider or a provider within the authorizing providers department within the previous 12 mos or has a future within next 30 days. See \"Patient Info\" tab in inbasket, or \"Choose Columns\" in Meds & Orders section of the refill encounter.              Passed - Medication is active on med list        rosuvastatin (CRESTOR) 10 MG tablet [Pharmacy Med Name: ROSUVASTATIN  Last Written Prescription Date:  10/26/2019  Last Fill Quantity: 90,  # refills: 0   Last office visit: 11/26/2019 with prescribing provider:     Future Office Visit:   CALCIUM 10 MG 10 TAB] 90 tablet 0     Sig: TAKE 1 TABLET (10MG) BY MOUTH DAILY       Statins Protocol Failed - 1/23/2020  9:35 AM        Failed - LDL on file in past 12 months     Recent Labs   Lab Test 03/15/18  0554   LDL 33             Passed - No abnormal creatine kinase in past 12 months     Recent Labs   Lab Test 02/09/15  1250   CKT 37                Passed - Recent (12 mo) or future (30 days) visit within the authorizing provider's specialty     Patient has had an office visit with the authorizing provider or a provider within the authorizing providers department within " "the previous 12 mos or has a future within next 30 days. See \"Patient Info\" tab in inbasket, or \"Choose Columns\" in Meds & Orders section of the refill encounter.              Passed - Medication is active on med list        Passed - Patient is age 18 or older        Passed - No active pregnancy on record        Passed - No positive pregnancy test in past 12 months        levothyroxine (SYNTHROID/LEVOTHROID) 50 MCG tablet [Pharmacy Med Name:  Last Written Prescription Date:  10/26/2019  Last Fill Quantity: 90,  # refills: 0   Last office visit: 11/26/2019 with prescribing provider:     Future Office Visit:   LEVOTHYROXINE SODIUM 50 MCG 50 TAB] 90 tablet 0     Sig: TAKE 1 TABLET (50MCG) BY MOUTH DAILY       Thyroid Protocol Failed - 1/23/2020  9:35 AM        Failed - Normal TSH on file in past 12 months     Recent Labs   Lab Test 07/02/19  1909   TSH 5.89*              Passed - Patient is 12 years or older        Passed - Recent (12 mo) or future (30 days) visit within the authorizing provider's specialty     Patient has had an office visit with the authorizing provider or a provider within the authorizing providers department within the previous 12 mos or has a future within next 30 days. See \"Patient Info\" tab in inbasket, or \"Choose Columns\" in Meds & Orders section of the refill encounter.              Passed - Medication is active on med list        Passed - No active pregnancy on record     If patient is pregnant or has had a positive pregnancy test, please check TSH.          Passed - No positive pregnancy test in past 12 months     If patient is pregnant or has had a positive pregnancy test, please check TSH.          " Initial (On Arrival)

## 2023-12-14 NOTE — ED ADULT NURSE NOTE - CHIEF COMPLAINT
Let your family doctor know Dr. Alanis thinks it is okay for you to go back on Ozempic.
The patient is a 55y Female complaining of nausea.

## 2024-03-21 ENCOUNTER — APPOINTMENT (OUTPATIENT)
Dept: PULMONOLOGY | Facility: CLINIC | Age: 59
End: 2024-03-21
Payer: MEDICAID

## 2024-03-21 DIAGNOSIS — G47.33 OBSTRUCTIVE SLEEP APNEA (ADULT) (PEDIATRIC): ICD-10-CM

## 2024-03-21 DIAGNOSIS — J45.909 UNSPECIFIED ASTHMA, UNCOMPLICATED: ICD-10-CM

## 2024-03-21 PROCEDURE — 99213 OFFICE O/P EST LOW 20 MIN: CPT

## 2024-03-21 NOTE — HISTORY OF PRESENT ILLNESS
[Cough Variant] : cough variant [Doing Well] : doing well [Well Controlled] : Well controlled [Chest Tightness Or Heavy Pressure] : resolved chest tightness [Cough] : resolved coughing [Good Control] : peak flow has been good [Checks Regularly] : The patient checks ~his/her~ peak flow regularly [Adherent] : the patient is adherent with ~his/her~ medication regimen [Goals--Doing Well] : the patient is doing well with ~his/her~ asthma goals [Follow-Up - Routine Clinic] : a routine clinic follow-up of [None] : No associated symptoms are reported [Good Sleep Hygiene] : good sleep hygiene [Good Tolerance] : good tolerance of treatment [Good Compliance] : good compliance with treatment [de-identified] : APAP  [Good Symptom Control] : good symptom control [Home] : at home, [unfilled] , at the time of the visit. [Verbal consent obtained from patient] : the patient, [unfilled] [Medical Office: ([facility] [place])___] : at the medical office located in

## 2024-03-21 NOTE — ASSESSMENT
[FreeTextEntry1] : Severe PRAVIN on APAP 4-12  Cough variant asthma well compensated off controller at this point  SP Therapy for severe COVID 19 pneumonia.

## 2024-07-07 NOTE — ED PROVIDER NOTE - CLINICAL SUMMARY MEDICAL DECISION MAKING FREE TEXT BOX
RT to bedside to obtain ABG.    58-year-old female presenting to the ED for dental pain.  Progressive over the past 3 days.  Noted to have poor dentition with multiple tooth caries.  Currently will follow-up with dental clinic.  Given ibuprofen and amoxicillin sent to the pharmacy.  Discharged home with dental clinic follow-up.  Patient is a plan.

## 2024-09-25 ENCOUNTER — APPOINTMENT (OUTPATIENT)
Dept: PULMONOLOGY | Facility: CLINIC | Age: 59
End: 2024-09-25
Payer: MEDICAID

## 2024-09-25 DIAGNOSIS — G47.33 OBSTRUCTIVE SLEEP APNEA (ADULT) (PEDIATRIC): ICD-10-CM

## 2024-09-25 DIAGNOSIS — J45.909 UNSPECIFIED ASTHMA, UNCOMPLICATED: ICD-10-CM

## 2024-09-25 PROCEDURE — 99213 OFFICE O/P EST LOW 20 MIN: CPT | Mod: 95

## 2024-09-25 NOTE — HISTORY OF PRESENT ILLNESS
[Cough Variant] : cough variant [Doing Well] : doing well [Well Controlled] : Well controlled [Cough] : resolved coughing [Chest Tightness Or Heavy Pressure] : resolved chest tightness [Checks Regularly] : The patient checks ~his/her~ peak flow regularly [Good Control] : peak flow has been good [Adherent] : the patient is adherent with ~his/her~ medication regimen [Goals--Doing Well] : the patient is doing well with ~his/her~ asthma goals [Follow-Up - Routine Clinic] : a routine clinic follow-up of [None] : No associated symptoms are reported [Good Sleep Hygiene] : good sleep hygiene [Good Compliance] : good compliance with treatment [Good Tolerance] : good tolerance of treatment [Good Symptom Control] : good symptom control [de-identified] : APAP  [Home] : at home, [unfilled] , at the time of the visit. [Medical Office: ([facility])___] : at the medical office located in  [Verbal consent obtained from patient] : the patient, [unfilled]

## 2025-03-27 ENCOUNTER — APPOINTMENT (OUTPATIENT)
Dept: PULMONOLOGY | Facility: CLINIC | Age: 60
End: 2025-03-27
Payer: MEDICAID

## 2025-03-27 DIAGNOSIS — G47.33 OBSTRUCTIVE SLEEP APNEA (ADULT) (PEDIATRIC): ICD-10-CM

## 2025-03-27 DIAGNOSIS — J45.909 UNSPECIFIED ASTHMA, UNCOMPLICATED: ICD-10-CM

## 2025-03-27 PROCEDURE — 99214 OFFICE O/P EST MOD 30 MIN: CPT | Mod: 95

## 2025-03-28 RX ORDER — ALBUTEROL SULFATE 90 UG/1
108 (90 BASE) AEROSOL, METERED RESPIRATORY (INHALATION)
Qty: 1 | Refills: 3 | Status: ACTIVE | COMMUNITY
Start: 2025-03-27 | End: 1900-01-01

## 2025-08-13 ENCOUNTER — OUTPATIENT (OUTPATIENT)
Dept: OUTPATIENT SERVICES | Facility: HOSPITAL | Age: 60
LOS: 1 days | End: 2025-08-13
Payer: COMMERCIAL

## 2025-08-13 DIAGNOSIS — K02.9 DENTAL CARIES, UNSPECIFIED: ICD-10-CM

## 2025-08-13 PROCEDURE — D0220: CPT

## 2025-08-13 PROCEDURE — D7140: CPT

## 2025-08-13 PROCEDURE — D0140: CPT

## 2025-08-15 DIAGNOSIS — K02.9 DENTAL CARIES, UNSPECIFIED: ICD-10-CM
